# Patient Record
Sex: FEMALE | Race: WHITE | NOT HISPANIC OR LATINO | Employment: FULL TIME | URBAN - NONMETROPOLITAN AREA
[De-identification: names, ages, dates, MRNs, and addresses within clinical notes are randomized per-mention and may not be internally consistent; named-entity substitution may affect disease eponyms.]

---

## 2017-01-04 ENCOUNTER — TRANSCRIBE ORDERS (OUTPATIENT)
Dept: PHYSICAL THERAPY | Facility: HOSPITAL | Age: 47
End: 2017-01-04

## 2017-01-04 DIAGNOSIS — S66.812A HAND AND WRIST EXTENSOR TENDON RUPTURE, LEFT, INITIAL ENCOUNTER: Primary | ICD-10-CM

## 2017-01-09 ENCOUNTER — HOSPITAL ENCOUNTER (OUTPATIENT)
Dept: PHYSICAL THERAPY | Facility: HOSPITAL | Age: 47
Setting detail: THERAPIES SERIES
Discharge: HOME | End: 2017-01-09

## 2017-01-09 DIAGNOSIS — S66.812A HAND AND WRIST EXTENSOR TENDON RUPTURE, LEFT, INITIAL ENCOUNTER: ICD-10-CM

## 2017-01-09 DIAGNOSIS — Z48.89 AFTERCARE FOLLOWING SURGERY: Primary | ICD-10-CM

## 2017-01-09 PROCEDURE — 97161 PT EVAL LOW COMPLEX 20 MIN: CPT

## 2017-01-09 NOTE — PROGRESS NOTES
Outpatient Physical Therapy Ortho Initial Evaluation  [facility]     Patient Name: Maribell Whitley  : 1970  MRN: [id number]  Today's Date: 2017      Visit Date: 2017    There is no problem list on file for this patient.       History reviewed. No pertinent past medical history.     Past Surgical History   Procedure Laterality Date   • Hysterectomy     • Gastric bypass  2005   • Cholecystectomy  2016       Visit Dx:     ICD-10-CM ICD-9-CM   1. Aftercare following surgery Z48.89 V58.89   2. Hand and wrist extensor tendon rupture, left, initial encounter S66.812A 842.10             Patient History       17 0850 17 0800       History    Chief Complaint Difficulty with daily activities;Joint stiffness;Joint swelling;Muscle weakness  -TC      Date Current Problem(s) Began 16  -TC      Brief Description of Current Complaint Pt reports that she has had elbow pain with injections over the years, back in May she was exercising a lot and receiving another series of injections. She was getting coffee for her  one morning and felt a pop on the outside of her left elbow. She had some pain and swelling but this disipated and she did not seek attention right away. Months later she finally went to an Orthopedic Surgeon for medical attention/consultation because she had lost function of her left wrist. They found her common ext tendon and sheath had been ruptured on the Left. She underwent sx 16.   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 Yes  -TC      What clinical tests have you had for this problem? MRI  -TC      Results of  Clinical Tests see EPIC imaging section   -TC      Pain     Pain Location Elbow   left   -TC      Pain at Present 0  -TC --  -TC     Pain at Best 0  -TC --  -TC     Pain at Worst 5  -TC --  -TC     Pain Frequency Rarely  -TC --  -TC     Difficulties at work? unable to fully use her arm for typing, lifting, carrying things   -TC --  -TC     Difficulties with ADL's? she has been using simple methods of dressing such as slip on shoes, unable to tie shoes, cook, or clean much  but caan dress herself just requires increased time   -TC --  -TC     Fall Risk Assessment    Any falls in the past year: No  -TC      Services    Prior Rehab/Home Health Experiences Yes  -TC      When was the prior experience with Rehab/Home Health back pain   -TC      Daily Activities    Primary Language English  -TC      Are you able to read Yes  -TC      Are you able to write Yes  -TC      How does patient learn best? Listening;Reading;Demonstration  -TC      Patient is concerned about/has problems with Performing home management (household chores, shopping, care of dependents);Performing job responsibilities/community activities (work, school,;Grasping objects lifting  -TC      Does patient have problems with the following? None  -TC        User Key  (r) = Recorded By, (t) = Taken By, (c) = Cosigned By    Initials Name Provider Type    TC Macey Patel, PT Physical Therapist                PT Ortho       01/09/17 0900    Precautions and Contraindications    Precautions/Limitations other (see comments)  -TC    Precautions no strengthening; No active wrist ROM for 4 weeks, splint for 6-8 weeks   -TC    Pain Assessment    Pain Assessment No/denies pain  -TC    Posture/Observations    Posture/Observations Comments scapular winging noted with erect sitting position   -TC    Sensation    Light Touch No apparent deficits  -TC    Quarter Clearing    Quarter Clearing Upper Quarter Clearing  -TC    Sensory Screen for Light Touch- Upper Quarter  Clearing    C4 (posterior shoulder) Bilateral:;Intact  -TC    C5 (lateral upper arm) Bilateral:;Intact  -TC    C6 (tip of thumb) Bilateral:;Intact  -TC    C7 (tip of 3rd finger) Bilateral:;Intact  -TC    C8 (tip of 5th finger) Bilateral:;Intact  -TC    T1 (medial lower arm) Bilateral:;Intact  -TC    Myotomal Screen- Upper Quarter Clearing    Shoulder flexion (C5) Right:;5 (Normal);Left:  -TC    Elbow flexion/wrist extension (C6) Right:;4+ (Good +)  -TC    Elbow extension/wrist flexion (C7) Right:;4+ (Good +)  -TC    Finger flexion/ (C8) Right:;4 (Good)  -TC    Finger abduction (T1) Right:;4- (Good -)  -TC     --   Right Hand  65,62,65=64lb;Right Pinch  15,14,15=14.6  -TC    ROM (Range of Motion)    General ROM upper extremity range of motion deficits identified;hand range of motion deficits identified  -TC    General ROM Detail L shoulder ROM WFLs;   -TC    General UE Assessment    ROM elbow/forearm, left: UE ROM deficit;wrist, left: UE ROM deficit  -TC    Left Elbow/Forearm    Extension/Flexion PROM Deficit 25 to 65 PROM to AAROM   -TC    Supination PROM Deficit 75  -TC    Pronation PROM Deficit 45  -TC    Left Wrist    Flexion PROM Deficit in neutral wrist and elbow position; 55 deg  -TC    Extension PROM Deficit 10 deg; wrist in neutral position  -TC    General Hand Assessment    ROM left thumb ROM was WNL;left finger ROM deficit  -TC    ROM Detail Left MCP flexion limitation noted 25%; joint mobility WNLs, extensor tendon tightness noted   -TC    MMT (Manual Muscle Testing)    General MMT Assessment upper extremity strength deficits identified;hand strength deficits identified  -TC    Upper Extremity    Upper Ext Manual Muscle Testing Detail L shoulder grossly 4/5; elbow and wrist/  not tested d/t post sx status and precautions  -TC    Girth    Girth Measured? Left Upper Extremity;Right Upper Extremity  -TC    RUE Edema - Circumference (cm)    Proximal to Elbow Crease (5 inches) 21 cm   1 in  prox  -TC    Distal to Elbow Crease (5 inches) 20.5 cm   1 inch distal  -TC    LUE Edema - Circumference (cm)    Proximal to Elbow Crease (5 inches) 22 cm   1 inch proximal  -TC    Distal to Elbow Crease (5 inches) 21.5 cm   1 inch distal   -TC      User Key  (r) = Recorded By, (t) = Taken By, (c) = Cosigned By    Initials Name Provider Type    TC Macey Patel, PT Physical Therapist                            Therapy Education       01/09/17 0850    Therapy Education    Given HEP;Symptoms/condition management;Pain management;Posture/body mechanics  -TC    Program New   added tendon glides of the LUE in the splint  -TC    How Provided Verbal;Demonstration  -TC    Provided to Patient  -TC    Level of Understanding Verbalized;Demonstrated;Teach back education performed  -TC      User Key  (r) = Recorded By, (t) = Taken By, (c) = Cosigned By    Initials Name Provider Type    TC Macey Patel, PT Physical Therapist                PT OP Goals       01/09/17 0850          PT Short Term Goals    STG Date to Achieve 02/06/17  -TC      STG 1 Patient will demonstrate understanding of precautions post surgery.   -TC      STG 1 Progress New  -TC      STG 2 Pt will demonstrate improved swelling of the L elbow region within <0.5cm of the unaffected side.   -TC      STG 2 Progress New  -TC      STG 3 Pt will demonstrate decreased muscle guarding and guarded posturing on the surgical side.   -TC      STG 3 Progress New  -TC      STG 4 Pt will demonstrate full PROM on the affected side of the elbow.  -TC      STG 4 Progress New  -TC      Long Term Goals    LTG Date to Achieve 03/06/17  -TC      LTG 1 Pt will illustrate full PROM of the wrist.   -TC      LTG 1 Progress New  -TC      LTG 2 Pt will illustrate  strength of >20lbs of the LUE with elbow by side.   -TC      LTG 2 Progress New  -TC      LTG 3 Pt will illustrate pinch  of LUE of >5lbs in order to begin to improve her functional ability with ADLs and work  related actvity. .  -TC      LTG 3 Progress New  -TC      LTG 4 Pt will report ability to perform typing activity without increased pain and signs of fatigue.   -TC      LTG 4 Progress New  -TC      Time Calculation    PT Goal Re-Cert Due Date 02/09/17  -TC        User Key  (r) = Recorded By, (t) = Taken By, (c) = Cosigned By    Initials Name Provider Type    TC Macey Patel, PT Physical Therapist                PT Assessment/Plan       01/09/17 0850          PT Assessment    Functional Limitations Limitation in home management;Limitations in community activities;Limitations in functional capacity and performance;Performance in self-care ADL;Performance in work activities  -TC      Impairments Gait;Pain;Impaired muscle endurance;Muscle strength;Impaired flexibility;Range of motion;Joint mobility;Posture;Impaired muscle power;Poor body mechanics;Impaired muscle length  -TC      Assessment Comments Pt presents to us 18 days status post repair of an extensor tendon rupture on the left. She does not complain of much pain and reports that she rarely will have pain. She denies any changes in sensation or odd paresthesias. She presented with her wrist immobilizer donned and maintained a guarded closed pack position of the left arm initially. Upon assessment she presents with limited elbow joint mobility, carpal mobility, and poor bicep and tricep flexibility. These impairments along with a slight increase in swelling are limiting her PROM in all planes of the elbow and wrist. She was encouraged to relax her arm by her side and try to use her arm with everyday, light activities (no lifting/carrying) while aware of her other precautions s/p surgery. She is highly motivated and young, I believe she will recover quite well and demonstrate great results. Thank you for this referral, we look forward to working with her and will be sure to follow the most evidence based protocol s/p repair and follow healing precautions.    -TC      Please refer to paper survey for additional self-reported information Yes  -TC      Rehab Potential Good  -TC      Patient/caregiver participated in establishment of treatment plan and goals No  -TC      Patient would benefit from skilled therapy intervention Yes  -TC      PT Plan    PT Frequency 3x/week  -TC      Predicted Duration of Therapy Intervention (days/wks) 4-8 weeks   -TC      Planned CPT's? PT EVAL: 84650;PT THER PROC EA 15 MIN: 91090;PT THER ACT EA 15 MIN: 25351;PT MANUAL THERAPY EA 15 MIN: 11376;PT ELECTRICAL STIM UNATTEND: ;PT ELECTRICAL STIM ATTD EA 15 MIN: 07414;PT NEUROMUSC RE-EDUCATION EA 15 MIN: 46080;PT ULTRASOUND EA 15 MIN: 17359  -TC      Physical Therapy Interventions (Optional Details) manual therapy techniques;gross motor skills;home exercise program;joint mobilization;postural re-education;patient/family education;neuromuscular re-education;ROM (Range of Motion);strengthening  -TC      PT Plan Comments Initially we will address edema, joint mobility, muscle extensibility thus improving overall PROM. We will progress her according to proper healing times and the most evidence based research following an extensor tendon repair with AAROM and stability exercises working towards AROM and strengthening as appropriate. Again thank you for this referral!    -TC        User Key  (r) = Recorded By, (t) = Taken By, (c) = Cosigned By    Initials Name Provider Type    TC Macey Patel PT Physical Therapist                                    Time Calculation:   Start Time: 0850  Stop Time: 0922  Time Calculation (min): 32 min     Therapy Charges for Today     Code Description Service Date Service Provider Modifiers Qty    48659491169 HC PT EVAL LOW COMPLEXITY 2 1/9/2017 Macey Patel PT GP 1                    Macey Patel PT, DPT  1/9/2017

## 2017-01-12 ENCOUNTER — HOSPITAL ENCOUNTER (OUTPATIENT)
Dept: PHYSICAL THERAPY | Facility: HOSPITAL | Age: 47
Setting detail: THERAPIES SERIES
Discharge: HOME | End: 2017-01-12

## 2017-01-12 DIAGNOSIS — S66.812A HAND AND WRIST EXTENSOR TENDON RUPTURE, LEFT, INITIAL ENCOUNTER: Primary | ICD-10-CM

## 2017-01-12 DIAGNOSIS — Z48.89 AFTERCARE FOLLOWING SURGERY: ICD-10-CM

## 2017-01-12 PROCEDURE — 97140 MANUAL THERAPY 1/> REGIONS: CPT

## 2017-01-12 PROCEDURE — 97032 APPL MODALITY 1+ESTIM EA 15: CPT

## 2017-01-12 NOTE — PROGRESS NOTES
Outpatient Physical Therapy Ortho Treatment Note  [facility]     Patient Name: Maribell Whitley  : 1970  MRN: [id number]  Today's Date: 2017      Visit Date: 2017    Visit Dx:    ICD-10-CM ICD-9-CM   1. Hand and wrist extensor tendon rupture, left, initial encounter S66.812A 842.10   2. Aftercare following surgery Z48.89 V58.89       There is no problem list on file for this patient.       No past medical history on file.     Past Surgical History   Procedure Laterality Date   • Hysterectomy     • Gastric bypass     • Cholecystectomy                               PT Assessment/Plan       17 1644 17 0800 17 0850    PT Assessment    Functional Limitations   Limitation in home management;Limitations in community activities;Limitations in functional capacity and performance;Performance in self-care ADL;Performance in work activities  -TC    Impairments   Gait;Pain;Impaired muscle endurance;Muscle strength;Impaired flexibility;Range of motion;Joint mobility;Posture;Impaired muscle power;Poor body mechanics;Impaired muscle length  -TC    Assessment Comments  Pt reported slight soreness in UT area but still no guarding. Her joint immobility and soft tissue restrictions continue to limit her overall PROM, this was addressed with manual techniques. We addressed her edema with LaserStim today, reassess next treatment.   -TC Pt presents to us 18 days status post repair of an extensor tendon rupture on the left. She does not complain of much pain and reports that she rarely will have pain. She denies any changes in sensation or odd paresthesias. She presented with her wrist immobilizer donned and maintained a guarded closed pack position of the left arm initially. Upon assessment she presents with limited elbow joint mobility, carpal mobility, and poor bicep and tricep flexibility. These impairments along with a slight increase in swelling are limiting her PROM in all planes of the  elbow and wrist. She was encouraged to relax her arm by her side and try to use her arm with everyday, light activities (no lifting/carrying) while aware of her other precautions s/p surgery. She is highly motivated and young, I believe she will recover quite well and demonstrate great results. Thank you for this referral, we look forward to working with her and will be sure to follow the most evidence based protocol s/p repair and follow healing precautions.   -TC    Please refer to paper survey for additional self-reported information   Yes  -TC    Rehab Potential   Good  -TC    Patient/caregiver participated in establishment of treatment plan and goals   No  -TC    Patient would benefit from skilled therapy intervention   Yes  -TC    PT Plan    PT Frequency   3x/week  -TC    Predicted Duration of Therapy Intervention (days/wks)   4-8 weeks   -TC    Planned CPT's?   PT EVAL: 79802;PT THER PROC EA 15 MIN: 07229;PT THER ACT EA 15 MIN: 41073;PT MANUAL THERAPY EA 15 MIN: 17597;PT ELECTRICAL STIM UNATTEND: ;PT ELECTRICAL STIM ATTD EA 15 MIN: 54923;PT NEUROMUSC RE-EDUCATION EA 15 MIN: 12806;PT ULTRASOUND EA 15 MIN: 79483  -TC    Physical Therapy Interventions (Optional Details)   manual therapy techniques;gross motor skills;home exercise program;joint mobilization;postural re-education;patient/family education;neuromuscular re-education;ROM (Range of Motion);strengthening  -TC    PT Plan Comments  Continue to focus on decreasing edema, mobility, guarding to improve PROM.   -TC Initially we will address edema, joint mobility, muscle extensibility thus improving overall PROM. We will progress her according to proper healing times and the most evidence based research following an extensor tendon repair with AAROM and stability exercises working towards AROM and strengthening as appropriate. Again thank you for this referral!    -TC      User Key  (r) = Recorded By, (t) = Taken By, (c) = Cosigned By    Initials Name  Provider Type    TC Macey Patel, PT Physical Therapist                    Exercises       01/13/17 1546          Subjective Comments    Subjective Comments Pt reports little less sore today, she has been able to move more at work and has been more aware of her posture.  -TC      Subjective Pain    Able to rate subjective pain? yes  -TC      Pre-Treatment Pain Level 1  -TC      Subjective Pain Comment L UT soreness   -TC      Exercise 1    Exercise Name 1 AROM elbow sup/pron seated elbow and wrist hand supported in neutral    without splint   -TC      Cueing 1 Verbal;Tactile;Demo  -TC      Time (Minutes) 1 5  -TC      Exercise 2    Exercise Name 2 seated, forearm on table in pronated position, performed active digit extension to MCP, wrist in neutral   -TC      Cueing 2 Verbal;Tactile;Demo  -TC      Sets 2 3  -TC      Reps 2 5  -TC      Exercise 3    Exercise Name 3 seated therpist supporting L wrist and MCP in ext, pt performed available digit ext   -TC      Cueing 3 Verbal;Tactile;Demo  -TC      Sets 3 3  -TC      Reps 3 5  -TC      Exercise 4    Exercise Name 4 Reviewed tendon glides with wrist in neutral, brace doffed   -TC      Time (Minutes) 4 1  -TC        User Key  (r) = Recorded By, (t) = Taken By, (c) = Cosigned By    Initials Name Provider Type    TC Macey Patel, PT Physical Therapist                        Manual Rx (last 36 hours)      Manual Treatments       01/13/17 1546 01/12/17 0800       Manual Rx 1    Manual Rx 1 Location L biceps, triceps, brachialis,   -TC L biceps, triceps, brachialis,   -TC     Manual Rx 1 Type mod STM, TrPs noted but no deep pressure applied today just mod   -TC mod STM, TrPs noted but no deep pressure applied today just mod   -TC     Manual Rx 1 Grade improved elbow PROM post   -TC improved elbow PROM post   -TC     Manual Rx 1 Duration 10  -TC 10  -TC     Manual Rx 2    Manual Rx 2 Location L ECRB, ECRL, brachioradialis  -TC L ECRB, ECRL, brachioradialis  -TC      Manual Rx 2 Type light STM  -TC light STM  -TC     Manual Rx 2 Duration 5  -TC 10  -TC     Manual Rx 3    Manual Rx 3 Location Scar  on L   -TC Scar  on L   -TC     Manual Rx 3 Type Scar tissue mob--light  -TC Scar tissue mob--light  -TC     Manual Rx 3 Duration 5  -TC 5  -TC     Manual Rx 4    Manual Rx 4 Location biceps stretch   -TC biceps stretch   -TC     Manual Rx 4 Duration 2  -TC 2  -TC     Manual Rx 5    Manual Rx 5 Location tricep stretch   -TC tricep stretch   -TC     Manual Rx 5 Duration 2  -TC 2  -TC     Manual Rx 6    Manual Rx 6 Location L wrist and hand  -TC L wrist and hand  -TC     Manual Rx 6 Type light PROM 80 flex 40 ext, no OP or stretching, available to tolerated range   -TC light PROM 80 flex 40 ext, no OP or stretching, available to tolerated range   -TC     Manual Rx 6 Duration 10  -TC 10  -TC       User Key  (r) = Recorded By, (t) = Taken By, (c) = Cosigned By    Initials Name Provider Type    TC Macey Patel, PT Physical Therapist                PT OP Goals       01/13/17 1546 01/12/17 0800 01/09/17 0850    PT Short Term Goals    STG Date to Achieve 02/06/17  -TC 02/06/17  -TC 02/06/17  -TC    STG 1 Patient will demonstrate understanding of precautions post surgery.   -TC Patient will demonstrate understanding of precautions post surgery.   -TC Patient will demonstrate understanding of precautions post surgery.   -TC    STG 1 Progress Ongoing  -TC Ongoing  -TC New  -TC    STG 1 Progress Comments  reviewed and understood today  -TC     STG 2 Pt will demonstrate improved swelling of the L elbow region within <0.5cm of the unaffected side.   -TC Pt will demonstrate improved swelling of the L elbow region within <0.5cm of the unaffected side.   -TC Pt will demonstrate improved swelling of the L elbow region within <0.5cm of the unaffected side.   -TC    STG 2 Progress Ongoing  -TC Ongoing  -TC New  -TC    STG 2 Progress Comments  22 prox, 21.5 distal to elbow; same today as  evaluation   -TC     STG 3 Pt will demonstrate decreased muscle guarding and guarded posturing on the surgical side.   -TC Pt will demonstrate decreased muscle guarding and guarded posturing on the surgical side.   -TC Pt will demonstrate decreased muscle guarding and guarded posturing on the surgical side.   -TC    STG 3 Progress Ongoing  -TC Ongoing  -TC New  -TC    STG 3 Progress Comments improving but still guarded and tight limiting ROM   -TC UT, elbow flexors, extensors still tight/guarded; ST restrictions present; wrist extensors slightly tender  -TC     STG 4 Pt will demonstrate full PROM on the affected side of the elbow.  -TC Pt will demonstrate full PROM on the affected side of the elbow.  -TC Pt will demonstrate full PROM on the affected side of the elbow.  -TC    STG 4 Progress Ongoing  -TC Ongoing  -TC New  -TC    STG 4 Progress Comments  worked on today  -TC     Long Term Goals    LTG Date to Achieve 03/06/17  -TC 03/06/17  -TC 03/06/17  -TC    LTG 1 Pt will illustrate full PROM of the wrist.   -TC Pt will illustrate full PROM of the wrist.   -TC Pt will illustrate full PROM of the wrist.   -TC    LTG 1 Progress Ongoing  -TC Ongoing  -TC New  -TC    LTG 1 Progress Comments still limited cristo with extension   -TC working towards  -TC     LTG 2 Pt will illustrate  strength of >20lbs of the LUE with elbow by side.   -TC Pt will illustrate  strength of >20lbs of the LUE with elbow by side.   -TC Pt will illustrate  strength of >20lbs of the LUE with elbow by side.   -TC    LTG 2 Progress Ongoing  -TC Ongoing  -TC New  -TC    LTG 3 Pt will illustrate pinch  of LUE of >5lbs in order to begin to improve her functional ability with ADLs and work related actvity. .  -TC Pt will illustrate pinch  of LUE of >5lbs in order to begin to improve her functional ability with ADLs and work related actvity. .  -TC Pt will illustrate pinch  of LUE of >5lbs in order to begin to improve her  functional ability with ADLs and work related actvity. .  -TC    LTG 3 Progress Ongoing  -TC Ongoing  -TC New  -TC    LTG 4 Pt will report ability to perform typing activity without increased pain and signs of fatigue.   -TC Pt will report ability to perform typing activity without increased pain and signs of fatigue.   -TC Pt will report ability to perform typing activity without increased pain and signs of fatigue.   -TC    LTG 4 Progress Ongoing  -TC Ongoing  -TC New  -TC    Time Calculation    PT Goal Re-Cert Due Date 02/09/17  -TC 02/09/17  -TC 02/09/17  -TC      User Key  (r) = Recorded By, (t) = Taken By, (c) = Cosigned By    Initials Name Provider Type    TC Macye Patel, PT Physical Therapist                Therapy Education       01/13/17 1642    Therapy Education    Given HEP;Posture/body mechanics  -TC    Program New   added digit ext with wrist in neutral, and elbow pron/sup with forearm supoorted and wrist in neutral   -TC    How Provided Verbal;Demonstration  -TC    Provided to Patient  -TC    Level of Understanding Demonstrated;Verbalized  -TC      01/12/17 0907    Therapy Education    Given HEP;Posture/body mechanics  -TC    Program Reinforced  -TC    How Provided Verbal  -TC    Provided to Patient  -TC    Level of Understanding Verbalized;Teach back education performed  -TC      01/09/17 0850    Therapy Education    Given HEP;Symptoms/condition management;Pain management;Posture/body mechanics  -TC    Program New   added tendon glides of the LUE in the splint  -TC    How Provided Verbal;Demonstration  -TC    Provided to Patient  -TC    Level of Understanding Verbalized;Demonstrated;Teach back education performed  -TC      User Key  (r) = Recorded By, (t) = Taken By, (c) = Cosigned By    Initials Name Provider Type    TC Macey Patel, PT Physical Therapist                Time Calculation:   Start Time: 0800  Stop Time: 0854  Time Calculation (min): 54 min  PT Non-Billable Time (min): 18  min  Total Timed Code Minutes- PT: 36 minute(s)    Therapy Charges for Today     Code Description Service Date Service Provider Modifiers Qty    82836430768 HC PT ELEC STIM EA-PER 15 MIN 1/12/2017 Macey Patel, PT GP 1    35101721210 HC PT MANUAL THERAPY EA 15 MIN 1/12/2017 Macey Patel, PT GP 2    28567524607 HC PT THER SUPP EA 15 MIN 1/12/2017 Macey Patel, PT GP 1                    Macey Patel, PT  1/13/2017

## 2017-01-13 ENCOUNTER — HOSPITAL ENCOUNTER (OUTPATIENT)
Dept: PHYSICAL THERAPY | Facility: HOSPITAL | Age: 47
Setting detail: THERAPIES SERIES
Discharge: HOME | End: 2017-01-13

## 2017-01-13 DIAGNOSIS — S66.812A HAND AND WRIST EXTENSOR TENDON RUPTURE, LEFT, INITIAL ENCOUNTER: Primary | ICD-10-CM

## 2017-01-13 DIAGNOSIS — Z48.89 AFTERCARE FOLLOWING SURGERY: ICD-10-CM

## 2017-01-13 PROCEDURE — 97140 MANUAL THERAPY 1/> REGIONS: CPT

## 2017-01-13 PROCEDURE — 97110 THERAPEUTIC EXERCISES: CPT

## 2017-01-13 NOTE — PROGRESS NOTES
Outpatient Physical Therapy Ortho Treatment Note   [place]     Patient Name: Maribell Whitley  : 1970  MRN: [id number]  Today's Date: 2017      Visit Date: 2017    Visit Dx:    ICD-10-CM ICD-9-CM   1. Hand and wrist extensor tendon rupture, left, initial encounter S66.812A 842.10   2. Aftercare following surgery Z48.89 V58.89       There is no problem list on file for this patient.       No past medical history on file.     Past Surgical History   Procedure Laterality Date   • Hysterectomy     • Gastric bypass     • Cholecystectomy                               PT Assessment/Plan       17 1644 17 0800 17 0850    PT Assessment    Functional Limitations   Limitation in home management;Limitations in community activities;Limitations in functional capacity and performance;Performance in self-care ADL;Performance in work activities  -TC    Impairments   Gait;Pain;Impaired muscle endurance;Muscle strength;Impaired flexibility;Range of motion;Joint mobility;Posture;Impaired muscle power;Poor body mechanics;Impaired muscle length  -TC    Assessment Comments Patient stated that the soreness in her UT is improved, she has been working on her posture and on staying active at work. I was able to achieve more PROM of the elbow and wrist today but her joint immobility continues to limit her overall motion. Her guarding is improving overall and we began distal initiation exercises in wrist neutral today in order to begin to promote some muscle stimulation and we also initiated ROM activities making sure to follow precautions an avoid active wrist ext.   -TC Pt reported slight soreness in UT area but still no guarding   -TC Pt presents to us 18 days status post repair of an extensor tendon rupture on the left. She does not complain of much pain and reports that she rarely will have pain. She denies any changes in sensation or odd paresthesias. She presented with her wrist immobilizer  donned and maintained a guarded closed pack position of the left arm initially. Upon assessment she presents with limited elbow joint mobility, carpal mobility, and poor bicep and tricep flexibility. These impairments along with a slight increase in swelling are limiting her PROM in all planes of the elbow and wrist. She was encouraged to relax her arm by her side and try to use her arm with everyday, light activities (no lifting/carrying) while aware of her other precautions s/p surgery. She is highly motivated and young, I believe she will recover quite well and demonstrate great results. Thank you for this referral, we look forward to working with her and will be sure to follow the most evidence based protocol s/p repair and follow healing precautions.   -TC    Please refer to paper survey for additional self-reported information   Yes  -TC    Rehab Potential   Good  -TC    Patient/caregiver participated in establishment of treatment plan and goals   No  -TC    Patient would benefit from skilled therapy intervention   Yes  -TC    PT Plan    PT Frequency   3x/week  -TC    Predicted Duration of Therapy Intervention (days/wks)   4-8 weeks   -TC    Planned CPT's?   PT EVAL: 96902;PT THER PROC EA 15 MIN: 11405;PT THER ACT EA 15 MIN: 59396;PT MANUAL THERAPY EA 15 MIN: 41059;PT ELECTRICAL STIM UNATTEND: ;PT ELECTRICAL STIM ATTD EA 15 MIN: 44965;PT NEUROMUSC RE-EDUCATION EA 15 MIN: 99540;PT ULTRASOUND EA 15 MIN: 00570  -TC    Physical Therapy Interventions (Optional Details)   manual therapy techniques;gross motor skills;home exercise program;joint mobilization;postural re-education;patient/family education;neuromuscular re-education;ROM (Range of Motion);strengthening  -TC    PT Plan Comments Continue to address edema, joint mobility, guarding and improve PROM. Reassess benefit of new HEP next session.   -TC  Initially we will address edema, joint mobility, muscle extensibility thus improving overall PROM. We will  progress her according to proper healing times and the most evidence based research following an extensor tendon repair with AAROM and stability exercises working towards AROM and strengthening as appropriate. Again thank you for this referral!    -TC      User Key  (r) = Recorded By, (t) = Taken By, (c) = Cosigned By    Initials Name Provider Type    TC Macey Patel, PT Physical Therapist                    Exercises       01/13/17 1546          Subjective Comments    Subjective Comments Pt reports little less sore today, she has been able to move more at work and has been more aware of her posture.  -TC      Subjective Pain    Able to rate subjective pain? yes  -TC      Pre-Treatment Pain Level 1  -TC      Subjective Pain Comment L UT soreness   -TC      Exercise 1    Exercise Name 1 AROM elbow sup/pron seated elbow and wrist hand supported in neutral    without splint   -TC      Cueing 1 Verbal;Tactile;Demo  -TC      Time (Minutes) 1 5  -TC      Exercise 2    Exercise Name 2 seated, forearm on table in pronated position, performed active digit extension to MCP, wrist in neutral   -TC      Cueing 2 Verbal;Tactile;Demo  -TC      Sets 2 3  -TC      Reps 2 5  -TC      Exercise 3    Exercise Name 3 seated therpist supporting L wrist and MCP in ext, pt performed available digit ext   -TC      Cueing 3 Verbal;Tactile;Demo  -TC      Sets 3 3  -TC      Reps 3 5  -TC      Exercise 4    Exercise Name 4 Reviewed tendon glides with wrist in neutral, brace doffed   -TC      Time (Minutes) 4 1  -TC        User Key  (r) = Recorded By, (t) = Taken By, (c) = Cosigned By    Initials Name Provider Type    TC Macey Patel, PT Physical Therapist                        Manual Rx (last 36 hours)      Manual Treatments       01/13/17 1546 01/12/17 0800       Manual Rx 1    Manual Rx 1 Location L biceps, triceps, brachialis,   -TC L biceps, triceps, brachialis,   -TC     Manual Rx 1 Type mod STM, TrPs noted but no deep  pressure applied today just mod   -TC mod STM, TrPs noted but no deep pressure applied today just mod   -TC     Manual Rx 1 Grade improved elbow PROM post   -TC improved elbow PROM post   -TC     Manual Rx 1 Duration 10  -TC 10  -TC     Manual Rx 2    Manual Rx 2 Location L ECRB, ECRL, brachioradialis  -TC L ECRB, ECRL, brachioradialis  -TC     Manual Rx 2 Type light STM  -TC light STM  -TC     Manual Rx 2 Duration 5  -TC 10  -TC     Manual Rx 3    Manual Rx 3 Location Scar  on L   -TC Scar  on L   -TC     Manual Rx 3 Type Scar tissue mob--light  -TC Scar tissue mob--light  -TC     Manual Rx 3 Duration 5  -TC 5  -TC     Manual Rx 4    Manual Rx 4 Location biceps stretch   -TC biceps stretch   -TC     Manual Rx 4 Duration 2  -TC 2  -TC     Manual Rx 5    Manual Rx 5 Location tricep stretch   -TC tricep stretch   -TC     Manual Rx 5 Duration 2  -TC 2  -TC     Manual Rx 6    Manual Rx 6 Location L wrist and hand  -TC L wrist and hand  -TC     Manual Rx 6 Type light PROM 80 flex 40 ext, no OP or stretching, available to tolerated range   -TC light PROM 80 flex 40 ext, no OP or stretching, available to tolerated range   -TC     Manual Rx 6 Duration 10  -TC 10  -TC       User Key  (r) = Recorded By, (t) = Taken By, (c) = Cosigned By    Initials Name Provider Type    TC Macey Patel, PT Physical Therapist                PT OP Goals       01/13/17 1546 01/12/17 0800 01/09/17 0850    PT Short Term Goals    STG Date to Achieve 02/06/17  -TC 02/06/17  -TC 02/06/17  -TC    STG 1 Patient will demonstrate understanding of precautions post surgery.   -TC Patient will demonstrate understanding of precautions post surgery.   -TC Patient will demonstrate understanding of precautions post surgery.   -TC    STG 1 Progress Ongoing  -TC Ongoing  -TC New  -TC    STG 1 Progress Comments  reviewed and understood today  -TC     STG 2 Pt will demonstrate improved swelling of the L elbow region within <0.5cm of the unaffected side.   -TC  Pt will demonstrate improved swelling of the L elbow region within <0.5cm of the unaffected side.   -TC Pt will demonstrate improved swelling of the L elbow region within <0.5cm of the unaffected side.   -TC    STG 2 Progress Ongoing  -TC Ongoing  -TC New  -TC    STG 2 Progress Comments  22 prox, 21.5 distal to elbow; same today as evaluation   -TC     STG 3 Pt will demonstrate decreased muscle guarding and guarded posturing on the surgical side.   -TC Pt will demonstrate decreased muscle guarding and guarded posturing on the surgical side.   -TC Pt will demonstrate decreased muscle guarding and guarded posturing on the surgical side.   -TC    STG 3 Progress Ongoing  -TC Ongoing  -TC New  -TC    STG 3 Progress Comments improving but still guarded and tight limiting ROM   -TC UT, elbow flexors, extensors still tight/guarded; ST restrictions present; wrist extensors slightly tender  -TC     STG 4 Pt will demonstrate full PROM on the affected side of the elbow.  -TC Pt will demonstrate full PROM on the affected side of the elbow.  -TC Pt will demonstrate full PROM on the affected side of the elbow.  -TC    STG 4 Progress Ongoing  -TC Ongoing  -TC New  -TC    STG 4 Progress Comments  worked on today  -TC     Long Term Goals    LTG Date to Achieve 03/06/17  -TC 03/06/17  -TC 03/06/17  -TC    LTG 1 Pt will illustrate full PROM of the wrist.   -TC Pt will illustrate full PROM of the wrist.   -TC Pt will illustrate full PROM of the wrist.   -TC    LTG 1 Progress Ongoing  -TC Ongoing  -TC New  -TC    LTG 1 Progress Comments still limited cristo with extension   -TC working towards  -TC     LTG 2 Pt will illustrate  strength of >20lbs of the LUE with elbow by side.   -TC Pt will illustrate  strength of >20lbs of the LUE with elbow by side.   -TC Pt will illustrate  strength of >20lbs of the LUE with elbow by side.   -TC    LTG 2 Progress Ongoing  -TC Ongoing  -TC New  -TC    LTG 3 Pt will illustrate pinch  of LUE  of >5lbs in order to begin to improve her functional ability with ADLs and work related actvity. .  -TC Pt will illustrate pinch  of LUE of >5lbs in order to begin to improve her functional ability with ADLs and work related actvity. .  -TC Pt will illustrate pinch  of LUE of >5lbs in order to begin to improve her functional ability with ADLs and work related actvity. .  -TC    LTG 3 Progress Ongoing  -TC Ongoing  -TC New  -TC    LTG 4 Pt will report ability to perform typing activity without increased pain and signs of fatigue.   -TC Pt will report ability to perform typing activity without increased pain and signs of fatigue.   -TC Pt will report ability to perform typing activity without increased pain and signs of fatigue.   -TC    LTG 4 Progress Ongoing  -TC Ongoing  -TC New  -TC    Time Calculation    PT Goal Re-Cert Due Date 02/09/17  -TC 02/09/17  -TC 02/09/17  -TC      User Key  (r) = Recorded By, (t) = Taken By, (c) = Cosigned By    Initials Name Provider Type    TC Macey Patel, PT Physical Therapist                Therapy Education       01/13/17 1642    Therapy Education    Given HEP;Posture/body mechanics  -TC    Program New   added digit ext with wrist in neutral, and elbow pron/sup with forearm supoorted and wrist in neutral   -TC    How Provided Verbal;Demonstration  -TC    Provided to Patient  -TC    Level of Understanding Demonstrated;Verbalized  -TC      01/12/17 0907    Therapy Education    Given HEP;Posture/body mechanics  -TC    Program Reinforced  -TC    How Provided Verbal  -TC    Provided to Patient  -TC    Level of Understanding Verbalized;Teach back education performed  -TC      01/09/17 0850    Therapy Education    Given HEP;Symptoms/condition management;Pain management;Posture/body mechanics  -TC    Program New   added tendon glides of the LUE in the splint  -TC    How Provided Verbal;Demonstration  -TC    Provided to Patient  -TC    Level of Understanding  Verbalized;Demonstrated;Teach back education performed  -TC      User Key  (r) = Recorded By, (t) = Taken By, (c) = Cosigned By    Initials Name Provider Type    TC Macey Patel, PT Physical Therapist                Time Calculation:   Start Time: 1546  Stop Time: 1632  Time Calculation (min): 46 min  Total Timed Code Minutes- PT: 46 minute(s)    Therapy Charges for Today     Code Description Service Date Service Provider Modifiers Qty    30281494225 HC PT MANUAL THERAPY EA 15 MIN 1/13/2017 Macey Patel, PT GP 2    86419984259 HC PT THER PROC EA 15 MIN 1/13/2017 Macey Patel PT GP 1                    Macey Patel PT  1/13/2017

## 2017-01-16 ENCOUNTER — HOSPITAL ENCOUNTER (OUTPATIENT)
Dept: PHYSICAL THERAPY | Facility: HOSPITAL | Age: 47
Setting detail: THERAPIES SERIES
Discharge: HOME | End: 2017-01-16

## 2017-01-16 DIAGNOSIS — Z48.89 AFTERCARE FOLLOWING SURGERY: Primary | ICD-10-CM

## 2017-01-16 DIAGNOSIS — S66.812A HAND AND WRIST EXTENSOR TENDON RUPTURE, LEFT, INITIAL ENCOUNTER: ICD-10-CM

## 2017-01-16 PROCEDURE — 97032 APPL MODALITY 1+ESTIM EA 15: CPT

## 2017-01-16 PROCEDURE — 97140 MANUAL THERAPY 1/> REGIONS: CPT

## 2017-01-16 NOTE — PROGRESS NOTES
Outpatient Physical Therapy Ortho Treatment Note  [facility]     Patient Name: Maribell Whitley  : 1970  MRN: [id number]  Today's Date: 2017      Visit Date: 2017    Visit Dx:    ICD-10-CM ICD-9-CM   1. Aftercare following surgery Z48.89 V58.89   2. Hand and wrist extensor tendon rupture, left, initial encounter S66.812A 842.10       There is no problem list on file for this patient.       No past medical history on file.     Past Surgical History   Procedure Laterality Date   • Hysterectomy     • Gastric bypass     • Cholecystectomy  2016                             PT Assessment/Plan       17 1649 17 1644 17 0800    PT Assessment    Assessment Comments Superior lateral portion of her incission was very tender to touch and if this continues to occur we may need to employ some desensitization techniques. However, the scar tissue itself is raised and slightly pink in appearance and we will monitor this as well  -EC Patient stated that the soreness in her UT is improved, she has been working on her posture and on staying active at work. I was able to achieve more PROM of the elbow and wrist today but her joint immobility continues to limit her overall motion. Her guarding is improving overall and we began distal initiation exercises in wrist neutral today in order to begin to promote some muscle stimulation and we also initiated ROM activities making sure to follow precautions an avoid active wrist ext.   -TC Pt reported slight soreness in UT area but still no guarding. Her joint immobility and soft tissue restrictions continue to limit her overall PROM, this was addressed with manual techniques. We addressed her edema with LaserStim today, reassess next treatment.   -TC    PT Plan    PT Plan Comments Continue with joint mobilizations and stretching.  -EC Continue to address edema, joint mobility, guarding and improve PROM. Reassess benefit of new HEP next session.   -TC Continue  to focus on decreasing edema, mobility, guarding to improve PROM.   -TC      User Key  (r) = Recorded By, (t) = Taken By, (c) = Cosigned By    Initials Name Provider Type    ANI Brand PTA Physical Therapy Assistant    TC Macey Patel, PT Physical Therapist                Modalities       01/16/17 1500          Subjective Comments    Subjective Comments Pt denies pain states was very sore over the weekend.  -EC      Subjective Pain    Able to rate subjective pain? yes  -EC      Pre-Treatment Pain Level 0  -EC      Post-Treatment Pain Level 0  -EC      ELECTRICAL STIMULATION    Attended/Unattended Attended  -EC      Stimulation Type --   Laser stim  -EC      Max mAmp --   Chronic Inflammation Mode  -EC      Location/Electrode Placement/Other L lateral elbow/epicondyle  -EC      Rx Minutes 10 mins  -EC        User Key  (r) = Recorded By, (t) = Taken By, (c) = Cosigned By    Initials Name Provider Type    EC Francisco Brand PTA Physical Therapy Assistant                Exercises       01/16/17 1500          Subjective Comments    Subjective Comments Pt denies pain states was very sore over the weekend.  -EC      Subjective Pain    Able to rate subjective pain? yes  -EC      Pre-Treatment Pain Level 0  -EC      Post-Treatment Pain Level 0  -EC        User Key  (r) = Recorded By, (t) = Taken By, (c) = Cosigned By    Initials Name Provider Type    EC Francisco Brand PTA Physical Therapy Assistant                        Manual Rx (last 36 hours)      Manual Treatments       01/16/17 1500          Manual Rx 1    Manual Rx 1 Location L bicep, brachioradialis, and pronator teres  -EC      Manual Rx 1 Type IASTM with EDGE tool (attempted briefly along incision but pt was guarded)  -EC      Manual Rx 1 Duration 15  -EC      Manual Rx 2    Manual Rx 2 Location Radial head  -EC      Manual Rx 2 Type rotational mobilizations  -EC      Manual Rx 2 Grade 2  -EC      Manual Rx 2 Duration 6  -EC      Manual Rx 3     Manual Rx 3 Location radial carpal   -EC      Manual Rx 3 Type rotational mobilizations with intermittent wrist flexion stretches  -EC      Manual Rx 3 Grade 1  -EC      Manual Rx 3 Duration 7  -EC        User Key  (r) = Recorded By, (t) = Taken By, (c) = Cosigned By    Initials Name Provider Type    EC Francisco Brand, DEBRA Physical Therapy Assistant                PT OP Goals       01/13/17 1546 01/12/17 0800 01/09/17 0850    PT Short Term Goals    STG Date to Achieve 02/06/17  -TC 02/06/17  -TC 02/06/17  -TC    STG 1 Patient will demonstrate understanding of precautions post surgery.   -TC Patient will demonstrate understanding of precautions post surgery.   -TC Patient will demonstrate understanding of precautions post surgery.   -TC    STG 1 Progress Ongoing  -TC Ongoing  -TC New  -TC    STG 1 Progress Comments  reviewed and understood today  -TC     STG 2 Pt will demonstrate improved swelling of the L elbow region within <0.5cm of the unaffected side.   -TC Pt will demonstrate improved swelling of the L elbow region within <0.5cm of the unaffected side.   -TC Pt will demonstrate improved swelling of the L elbow region within <0.5cm of the unaffected side.   -TC    STG 2 Progress Ongoing  -TC Ongoing  -TC New  -TC    STG 2 Progress Comments  22 prox, 21.5 distal to elbow; same today as evaluation   -TC     STG 3 Pt will demonstrate decreased muscle guarding and guarded posturing on the surgical side.   -TC Pt will demonstrate decreased muscle guarding and guarded posturing on the surgical side.   -TC Pt will demonstrate decreased muscle guarding and guarded posturing on the surgical side.   -TC    STG 3 Progress Ongoing  -TC Ongoing  -TC New  -TC    STG 3 Progress Comments improving but still guarded and tight limiting ROM   -TC UT, elbow flexors, extensors still tight/guarded; ST restrictions present; wrist extensors slightly tender  -TC     STG 4 Pt will demonstrate full PROM on the affected side of the  elbow.  -TC Pt will demonstrate full PROM on the affected side of the elbow.  -TC Pt will demonstrate full PROM on the affected side of the elbow.  -TC    STG 4 Progress Ongoing  -TC Ongoing  -TC New  -TC    STG 4 Progress Comments  worked on today  -TC     Long Term Goals    LTG Date to Achieve 03/06/17  -TC 03/06/17  -TC 03/06/17  -TC    LTG 1 Pt will illustrate full PROM of the wrist.   -TC Pt will illustrate full PROM of the wrist.   -TC Pt will illustrate full PROM of the wrist.   -TC    LTG 1 Progress Ongoing  -TC Ongoing  -TC New  -TC    LTG 1 Progress Comments still limited cristo with extension   -TC working towards  -TC     LTG 2 Pt will illustrate  strength of >20lbs of the LUE with elbow by side.   -TC Pt will illustrate  strength of >20lbs of the LUE with elbow by side.   -TC Pt will illustrate  strength of >20lbs of the LUE with elbow by side.   -TC    LTG 2 Progress Ongoing  -TC Ongoing  -TC New  -TC    LTG 3 Pt will illustrate pinch  of LUE of >5lbs in order to begin to improve her functional ability with ADLs and work related actvity. .  -TC Pt will illustrate pinch  of LUE of >5lbs in order to begin to improve her functional ability with ADLs and work related actvity. .  -TC Pt will illustrate pinch  of LUE of >5lbs in order to begin to improve her functional ability with ADLs and work related actvity. .  -TC    LTG 3 Progress Ongoing  -TC Ongoing  -TC New  -TC    LTG 4 Pt will report ability to perform typing activity without increased pain and signs of fatigue.   -TC Pt will report ability to perform typing activity without increased pain and signs of fatigue.   -TC Pt will report ability to perform typing activity without increased pain and signs of fatigue.   -TC    LTG 4 Progress Ongoing  -TC Ongoing  -TC New  -TC    Time Calculation    PT Goal Re-Cert Due Date 02/09/17  -TC 02/09/17  -TC 02/09/17  -TC      User Key  (r) = Recorded By, (t) = Taken By, (c) = Cosigned By     Initials Name Provider Type    TC Macey Patel, KARI Physical Therapist                Therapy Education       01/13/17 1642    Therapy Education    Given HEP;Posture/body mechanics  -TC    Program New   added digit ext with wrist in neutral, and elbow pron/sup with forearm supoorted and wrist in neutral   -TC    How Provided Verbal;Demonstration  -TC    Provided to Patient  -TC    Level of Understanding Demonstrated;Verbalized  -TC      01/12/17 0907    Therapy Education    Given HEP;Posture/body mechanics  -TC    Program Reinforced  -TC    How Provided Verbal  -TC    Provided to Patient  -TC    Level of Understanding Verbalized;Teach back education performed  -TC      User Key  (r) = Recorded By, (t) = Taken By, (c) = Cosigned By    Initials Name Provider Type    TC Macey Patel PT Physical Therapist                Time Calculation:   Start Time: 0348  Stop Time: 0426  Time Calculation (min): 38 min  Total Timed Code Minutes- PT: 38 minute(s)    Therapy Charges for Today     Code Description Service Date Service Provider Modifiers Qty    65281229140 HC PT ELEC STIM EA-PER 15 MIN 1/16/2017 Francisco Brand PTA GP 1    32172934780 HC PT MANUAL THERAPY EA 15 MIN 1/16/2017 Francisco Brand PTA GP 2                    Francisco Brand PTA  1/16/2017

## 2017-01-18 ENCOUNTER — HOSPITAL ENCOUNTER (OUTPATIENT)
Dept: PHYSICAL THERAPY | Facility: HOSPITAL | Age: 47
Setting detail: THERAPIES SERIES
Discharge: HOME | End: 2017-01-18

## 2017-01-18 DIAGNOSIS — Z48.89 AFTERCARE FOLLOWING SURGERY: Primary | ICD-10-CM

## 2017-01-18 PROCEDURE — 97140 MANUAL THERAPY 1/> REGIONS: CPT | Performed by: PHYSICAL THERAPIST

## 2017-01-20 ENCOUNTER — HOSPITAL ENCOUNTER (OUTPATIENT)
Dept: PHYSICAL THERAPY | Facility: HOSPITAL | Age: 47
Setting detail: THERAPIES SERIES
Discharge: HOME | End: 2017-01-20

## 2017-01-20 DIAGNOSIS — Z48.89 AFTERCARE FOLLOWING SURGERY: Primary | ICD-10-CM

## 2017-01-20 DIAGNOSIS — S66.812A HAND AND WRIST EXTENSOR TENDON RUPTURE, LEFT, INITIAL ENCOUNTER: ICD-10-CM

## 2017-01-20 PROCEDURE — 97140 MANUAL THERAPY 1/> REGIONS: CPT

## 2017-01-20 NOTE — PROGRESS NOTES
Outpatient Physical Therapy Ortho Treatment Note  [facility]     Patient Name: Maribell Whitley  : 1970  MRN: [id number]  Today's Date: 2017      Visit Date: 2017    Visit Dx:    ICD-10-CM ICD-9-CM   1. Aftercare following surgery Z48.89 V58.89   2. Hand and wrist extensor tendon rupture, left, initial encounter S66.812A 842.10       There is no problem list on file for this patient.       No past medical history on file.     Past Surgical History   Procedure Laterality Date   • Hysterectomy     • Gastric bypass     • Cholecystectomy  2016                             PT Assessment/Plan       17 1546 17 1600 17 1649    PT Assessment    Assessment Comments Her scar tissue mobility is improving quite well. We will measure her swelling next session but not significant difference was noted today. Her muscle extensibility surrounding the elbow continue to be stiff along with joint mobility of the elbow and wrist on the left but she continues to respond well to manual therapy and stretches. After therapy today her wrist extension improved  from 85 to 100 and her elbow flexion to 125, elbow extension measured at 3. We will continue working to restore her ROM.   -TC Needs to work on desensitizing the scar as she is getting decreased scar mobility which can cause long term problems. She was able to feel more stretch into flexion with focus on joint distraction today.  -HR Superior lateral portion of her incission was very tender to touch and if this continues to occur we may need to employ some desensitization techniques. However, the scar tissue itself is raised and slightly pink in appearance and we will monitor this as well  -EC    PT Plan    PT Plan Comments We will measure her inflammation next visit, continue to address elbow and wrist joint mobility and flexibility primarily in order to restore full PROM with progression towards protocol towards AROM.   -TC Continue with current  plan of care following restrictions of surgeon  -HR Continue with joint mobilizations and stretching.  -EC      User Key  (r) = Recorded By, (t) = Taken By, (c) = Cosigned By    Initials Name Provider Type    EC Francisco Brand, PTA Physical Therapy Assistant    HR Elza Paredes, PT Physical Therapist    TC Macey Patel, PT Physical Therapist                    Exercises       01/20/17 1546          Subjective Comments    Subjective Comments Patient reports that she is still a bit sore/achey in her left outside of elbow but not real pain she stated. She thinks she may have overdone her exercises. No increased pain at work   -TC      Subjective Pain    Able to rate subjective pain? yes  -TC      Pre-Treatment Pain Level 0  -TC      Post-Treatment Pain Level 0  -TC        User Key  (r) = Recorded By, (t) = Taken By, (c) = Cosigned By    Initials Name Provider Type    TC Macey Patel, PT Physical Therapist                        Manual Rx (last 36 hours)      Manual Treatments       01/20/17 1546          Manual Rx 1    Manual Rx 1 Location prone thoracic   -TC      Manual Rx 1 Type ext mob to T5-6 with cavitation noted, pt stated relief afterwards, followed by STM to rhomboids, UT and LS   -TC      Manual Rx 1 Grade 3  -TC      Manual Rx 1 Duration 5  -TC      Manual Rx 2    Manual Rx 2 Location Radial head  -TC      Manual Rx 2 Type rotational mobilizations  -TC      Manual Rx 2 Grade 3  -TC      Manual Rx 2 Duration 5  -TC      Manual Rx 3    Manual Rx 3 Location Scar  on L   -TC      Manual Rx 3 Type Scar tissue mob--light  -TC      Manual Rx 3 Grade light pressure but consistent  -TC      Manual Rx 3 Duration 10  -TC      Manual Rx 4    Manual Rx 4 Location biceps stretch with STM applied   -TC      Manual Rx 4 Grade 3 with distraction  -TC      Manual Rx 4 Duration 5  -TC      Manual Rx 5    Manual Rx 5 Location elbow flexion mob  -TC      Manual Rx 5 Grade 3  -TC      Manual Rx 5 Duration 5   -TC      Manual Rx 6    Manual Rx 6 Location L wrist and fingers  -TC      Manual Rx 6 Type PROM through available range; flex/ext,ulnar/radial deviation  -TC      Manual Rx 6 Duration 10  -TC      Manual Rx 7    Manual Rx 7 Location proximal radiocarpal joint  -TC      Manual Rx 7 Type ant glide with intermittent distraction into wrist ext PROM improved from 85 to 110 degrees passively    -TC      Manual Rx 7 Grade 3  -TC      Manual Rx 7 Duration 5  -TC        User Key  (r) = Recorded By, (t) = Taken By, (c) = Cosigned By    Initials Name Provider Type    TC Macey Patel, PT Physical Therapist                PT OP Goals       01/20/17 1546 01/18/17 1600 01/13/17 1546    PT Short Term Goals    STG Date to Achieve 02/06/17  -TC 02/06/17  -HR 02/06/17  -TC    STG 1 Patient will demonstrate understanding of precautions post surgery.   -TC Patient will demonstrate understanding of precautions post surgery.   -HR Patient will demonstrate understanding of precautions post surgery.   -TC    STG 1 Progress Ongoing  -TC Ongoing  -HR Ongoing  -TC    STG 2 Pt will demonstrate improved swelling of the L elbow region within <0.5cm of the unaffected side.   -TC Pt will demonstrate improved swelling of the L elbow region within <0.5cm of the unaffected side.   -HR Pt will demonstrate improved swelling of the L elbow region within <0.5cm of the unaffected side.   -TC    STG 2 Progress Ongoing  -TC Ongoing  -HR Ongoing  -TC    STG 3 Pt will demonstrate decreased muscle guarding and guarded posturing on the surgical side.   -TC Pt will demonstrate decreased muscle guarding and guarded posturing on the surgical side.   -HR Pt will demonstrate decreased muscle guarding and guarded posturing on the surgical side.   -TC    STG 3 Progress Ongoing  -TC Ongoing  -HR Ongoing  -TC    STG 3 Progress Comments improving, still presents with guarding at times   -TC  improving but still guarded and tight limiting ROM   -TC    STG 4 Pt will  demonstrate full PROM on the affected side of the elbow.  -TC Pt will demonstrate full PROM on the affected side of the elbow.  -HR Pt will demonstrate full PROM on the affected side of the elbow.  -TC    STG 4 Progress Ongoing  -TC Ongoing  -HR Ongoing  -TC    STG 4 Progress Comments working towards joint mobility and mm flexibility in order to improve this  -TC      Long Term Goals    LTG Date to Achieve 03/06/17  -TC 03/06/17  -HR 03/06/17  -TC    LTG 1 Pt will illustrate full PROM of the wrist.   -TC Pt will illustrate full PROM of the wrist.   -HR Pt will illustrate full PROM of the wrist.   -TC    LTG 1 Progress Ongoing  -TC Ongoing  -HR Ongoing  -TC    LTG 1 Progress Comments working towards, still limited with wrist ext mainly; improved from 85 deg passively to 110 post mobs   -TC  still limited cristo with extension   -TC    LTG 2 Pt will illustrate  strength of >20lbs of the LUE with elbow by side.   -TC Pt will illustrate  strength of >20lbs of the LUE with elbow by side.   -HR Pt will illustrate  strength of >20lbs of the LUE with elbow by side.   -TC    LTG 2 Progress Ongoing  -TC Ongoing  -HR Ongoing  -TC    LTG 3 Pt will illustrate pinch  of LUE of >5lbs in order to begin to improve her functional ability with ADLs and work related actvity. .  -TC Pt will illustrate pinch  of LUE of >5lbs in order to begin to improve her functional ability with ADLs and work related actvity. .  -HR Pt will illustrate pinch  of LUE of >5lbs in order to begin to improve her functional ability with ADLs and work related actvity. .  -TC    LTG 3 Progress Ongoing  -TC Ongoing  -HR Ongoing  -TC    LTG 4 Pt will report ability to perform typing activity without increased pain and signs of fatigue.   -TC Pt will report ability to perform typing activity without increased pain and signs of fatigue.   -HR Pt will report ability to perform typing activity without increased pain and signs of fatigue.   -TC     LTG 4 Progress Ongoing  -TC Ongoing  -HR Ongoing  -TC    Time Calculation    PT Goal Re-Cert Due Date 02/09/17  -TC 02/09/17  -HR 02/09/17  -TC      01/12/17 0800 01/09/17 0850       PT Short Term Goals    STG Date to Achieve 02/06/17  -TC 02/06/17  -TC     STG 1 Patient will demonstrate understanding of precautions post surgery.   -TC Patient will demonstrate understanding of precautions post surgery.   -TC     STG 1 Progress Ongoing  -TC New  -TC     STG 1 Progress Comments reviewed and understood today  -TC      STG 2 Pt will demonstrate improved swelling of the L elbow region within <0.5cm of the unaffected side.   -TC Pt will demonstrate improved swelling of the L elbow region within <0.5cm of the unaffected side.   -TC     STG 2 Progress Ongoing  -TC New  -TC     STG 2 Progress Comments 22 prox, 21.5 distal to elbow; same today as evaluation   -TC      STG 3 Pt will demonstrate decreased muscle guarding and guarded posturing on the surgical side.   -TC Pt will demonstrate decreased muscle guarding and guarded posturing on the surgical side.   -TC     STG 3 Progress Ongoing  -TC New  -TC     STG 3 Progress Comments UT, elbow flexors, extensors still tight/guarded; ST restrictions present; wrist extensors slightly tender  -TC      STG 4 Pt will demonstrate full PROM on the affected side of the elbow.  -TC Pt will demonstrate full PROM on the affected side of the elbow.  -TC     STG 4 Progress Ongoing  -TC New  -TC     STG 4 Progress Comments worked on today  -TC      Long Term Goals    LTG Date to Achieve 03/06/17  -TC 03/06/17  -TC     LTG 1 Pt will illustrate full PROM of the wrist.   -TC Pt will illustrate full PROM of the wrist.   -TC     LTG 1 Progress Ongoing  -TC New  -TC     LTG 1 Progress Comments working towards  -TC      LTG 2 Pt will illustrate  strength of >20lbs of the LUE with elbow by side.   -TC Pt will illustrate  strength of >20lbs of the LUE with elbow by side.   -TC     LTG 2  Progress Ongoing  -TC New  -TC     LTG 3 Pt will illustrate pinch  of LUE of >5lbs in order to begin to improve her functional ability with ADLs and work related actvity. .  -TC Pt will illustrate pinch  of LUE of >5lbs in order to begin to improve her functional ability with ADLs and work related actvity. .  -TC     LTG 3 Progress Ongoing  -TC New  -TC     LTG 4 Pt will report ability to perform typing activity without increased pain and signs of fatigue.   -TC Pt will report ability to perform typing activity without increased pain and signs of fatigue.   -TC     LTG 4 Progress Ongoing  -TC New  -TC     Time Calculation    PT Goal Re-Cert Due Date 02/09/17  -TC 02/09/17  -TC       User Key  (r) = Recorded By, (t) = Taken By, (c) = Cosigned By    Initials Name Provider Type    HR Elza Paredes, PT Physical Therapist    TC Macey Patel, PT Physical Therapist                Therapy Education       01/20/17 1645    Therapy Education    Given HEP;Posture/body mechanics  -TC    Program Reinforced  -TC    How Provided Verbal  -TC    Provided to Patient  -TC    Level of Understanding Verbalized  -TC      User Key  (r) = Recorded By, (t) = Taken By, (c) = Cosigned By    Initials Name Provider Type    TC Macey Patel, PT Physical Therapist                Time Calculation:   Start Time: 1546  Stop Time: 1631  Time Calculation (min): 45 min  Total Timed Code Minutes- PT: 45 minute(s)    Therapy Charges for Today     Code Description Service Date Service Provider Modifiers Qty    50769032924 HC PT MANUAL THERAPY EA 15 MIN 1/20/2017 Macey Patel, PT GP 3                    Macey Patel PT  1/20/2017

## 2017-01-20 NOTE — PROGRESS NOTES
Outpatient Physical Therapy Ortho Treatment Note  [facility]     Patient Name: Maribell Whitley  : 1970  MRN: [id number]  Today's Date: 2017      Visit Date: 2017    Visit Dx:    ICD-10-CM ICD-9-CM   1. Aftercare following surgery Z48.89 V58.89       There is no problem list on file for this patient.       No past medical history on file.     Past Surgical History   Procedure Laterality Date   • Hysterectomy     • Gastric bypass  2005   • Cholecystectomy                               PT Assessment/Plan       17 1600 17 1649 17 1644    PT Assessment    Assessment Comments Needs to work on desensitizing the scar as she is getting decreased scar mobility which can cause long term problems. She was able to feel more stretch into flexion with focus on joint distraction today.  -HR Superior lateral portion of her incission was very tender to touch and if this continues to occur we may need to employ some desensitization techniques. However, the scar tissue itself is raised and slightly pink in appearance and we will monitor this as well  -EC Patient stated that the soreness in her UT is improved, she has been working on her posture and on staying active at work. I was able to achieve more PROM of the elbow and wrist today but her joint immobility continues to limit her overall motion. Her guarding is improving overall and we began distal initiation exercises in wrist neutral today in order to begin to promote some muscle stimulation and we also initiated ROM activities making sure to follow precautions an avoid active wrist ext.   -TC    PT Plan    PT Plan Comments Continue with current plan of care following restrictions of surgeon  -HR Continue with joint mobilizations and stretching.  -EC Continue to address edema, joint mobility, guarding and improve PROM. Reassess benefit of new HEP next session.   -TC      User Key  (r) = Recorded By, (t) = Taken By, (c) = Cosigned By     Initials Name Provider Type    ANI Francisco JEANINE Brand, PTA Physical Therapy Assistant    HR Elza Paredes, PT Physical Therapist    TC Macey Patel, PT Physical Therapist                                       PT OP Goals       01/18/17 1600 01/13/17 1546 01/12/17 0800    PT Short Term Goals    STG Date to Achieve 02/06/17  -HR 02/06/17  -TC 02/06/17  -TC    STG 1 Patient will demonstrate understanding of precautions post surgery.   -HR Patient will demonstrate understanding of precautions post surgery.   -TC Patient will demonstrate understanding of precautions post surgery.   -TC    STG 1 Progress Ongoing  -HR Ongoing  -TC Ongoing  -TC    STG 1 Progress Comments   reviewed and understood today  -TC    STG 2 Pt will demonstrate improved swelling of the L elbow region within <0.5cm of the unaffected side.   -HR Pt will demonstrate improved swelling of the L elbow region within <0.5cm of the unaffected side.   -TC Pt will demonstrate improved swelling of the L elbow region within <0.5cm of the unaffected side.   -TC    STG 2 Progress Ongoing  -HR Ongoing  -TC Ongoing  -TC    STG 2 Progress Comments   22 prox, 21.5 distal to elbow; same today as evaluation   -TC    STG 3 Pt will demonstrate decreased muscle guarding and guarded posturing on the surgical side.   -HR Pt will demonstrate decreased muscle guarding and guarded posturing on the surgical side.   -TC Pt will demonstrate decreased muscle guarding and guarded posturing on the surgical side.   -TC    STG 3 Progress Ongoing  -HR Ongoing  -TC Ongoing  -TC    STG 3 Progress Comments  improving but still guarded and tight limiting ROM   -TC UT, elbow flexors, extensors still tight/guarded; ST restrictions present; wrist extensors slightly tender  -TC    STG 4 Pt will demonstrate full PROM on the affected side of the elbow.  -HR Pt will demonstrate full PROM on the affected side of the elbow.  -TC Pt will demonstrate full PROM on the affected side of the  elbow.  -TC    STG 4 Progress Ongoing  -HR Ongoing  -TC Ongoing  -TC    STG 4 Progress Comments   worked on today  -TC    Long Term Goals    LTG Date to Achieve 03/06/17  -HR 03/06/17  -TC 03/06/17  -TC    LTG 1 Pt will illustrate full PROM of the wrist.   -HR Pt will illustrate full PROM of the wrist.   -TC Pt will illustrate full PROM of the wrist.   -TC    LTG 1 Progress Ongoing  -HR Ongoing  -TC Ongoing  -TC    LTG 1 Progress Comments  still limited cristo with extension   -TC working towards  -TC    LTG 2 Pt will illustrate  strength of >20lbs of the LUE with elbow by side.   -HR Pt will illustrate  strength of >20lbs of the LUE with elbow by side.   -TC Pt will illustrate  strength of >20lbs of the LUE with elbow by side.   -TC    LTG 2 Progress Ongoing  -HR Ongoing  -TC Ongoing  -TC    LTG 3 Pt will illustrate pinch  of LUE of >5lbs in order to begin to improve her functional ability with ADLs and work related actvity. .  -HR Pt will illustrate pinch  of LUE of >5lbs in order to begin to improve her functional ability with ADLs and work related actvity. .  -TC Pt will illustrate pinch  of LUE of >5lbs in order to begin to improve her functional ability with ADLs and work related actvity. .  -TC    LTG 3 Progress Ongoing  -HR Ongoing  -TC Ongoing  -TC    LTG 4 Pt will report ability to perform typing activity without increased pain and signs of fatigue.   -HR Pt will report ability to perform typing activity without increased pain and signs of fatigue.   -TC Pt will report ability to perform typing activity without increased pain and signs of fatigue.   -TC    LTG 4 Progress Ongoing  -HR Ongoing  -TC Ongoing  -TC    Time Calculation    PT Goal Re-Cert Due Date 02/09/17  -HR 02/09/17  -TC 02/09/17  -TC      01/09/17 0850          PT Short Term Goals    STG Date to Achieve 02/06/17  -TC      STG 1 Patient will demonstrate understanding of precautions post surgery.   -TC      STG 1 Progress  New  -TC      STG 2 Pt will demonstrate improved swelling of the L elbow region within <0.5cm of the unaffected side.   -TC      STG 2 Progress New  -TC      STG 3 Pt will demonstrate decreased muscle guarding and guarded posturing on the surgical side.   -TC      STG 3 Progress New  -TC      STG 4 Pt will demonstrate full PROM on the affected side of the elbow.  -TC      STG 4 Progress New  -TC      Long Term Goals    LTG Date to Achieve 03/06/17  -TC      LTG 1 Pt will illustrate full PROM of the wrist.   -TC      LTG 1 Progress New  -TC      LTG 2 Pt will illustrate  strength of >20lbs of the LUE with elbow by side.   -TC      LTG 2 Progress New  -TC      LTG 3 Pt will illustrate pinch  of LUE of >5lbs in order to begin to improve her functional ability with ADLs and work related actvity. .  -TC      LTG 3 Progress New  -TC      LTG 4 Pt will report ability to perform typing activity without increased pain and signs of fatigue.   -TC      LTG 4 Progress New  -TC      Time Calculation    PT Goal Re-Cert Due Date 02/09/17  -TC        User Key  (r) = Recorded By, (t) = Taken By, (c) = Cosigned By    Initials Name Provider Type    HR Elza Paredes, PT Physical Therapist    TC Macey Patel PT Physical Therapist                Therapy Education       01/13/17 1642    Therapy Education    Given HEP;Posture/body mechanics  -TC    Program New   added digit ext with wrist in neutral, and elbow pron/sup with forearm supoorted and wrist in neutral   -TC    How Provided Verbal;Demonstration  -TC    Provided to Patient  -TC    Level of Understanding Demonstrated;Verbalized  -TC      User Key  (r) = Recorded By, (t) = Taken By, (c) = Cosigned By    Initials Name Provider Type    TC Macey Patel, PT Physical Therapist                Time Calculation:   Start Time: 1545  Stop Time: 1625  Time Calculation (min): 40 min  Total Timed Code Minutes- PT: 40 minute(s)                  Elza Paredes,  PT  1/20/2017

## 2017-01-23 ENCOUNTER — HOSPITAL ENCOUNTER (OUTPATIENT)
Dept: PHYSICAL THERAPY | Facility: HOSPITAL | Age: 47
Setting detail: THERAPIES SERIES
Discharge: HOME | End: 2017-01-23

## 2017-01-23 DIAGNOSIS — S66.812A HAND AND WRIST EXTENSOR TENDON RUPTURE, LEFT, INITIAL ENCOUNTER: ICD-10-CM

## 2017-01-23 DIAGNOSIS — Z48.89 AFTERCARE FOLLOWING SURGERY: Primary | ICD-10-CM

## 2017-01-23 PROCEDURE — 97140 MANUAL THERAPY 1/> REGIONS: CPT

## 2017-01-23 PROCEDURE — 97110 THERAPEUTIC EXERCISES: CPT

## 2017-01-23 NOTE — PROGRESS NOTES
Outpatient Physical Therapy Ortho Treatment Note  [facility]     Patient Name: Maribell Whitley  : 1970  MRN: [id number]  Today's Date: 2017      Visit Date: 2017    Visit Dx:    ICD-10-CM ICD-9-CM   1. Aftercare following surgery Z48.89 V58.89   2. Hand and wrist extensor tendon rupture, left, initial encounter S66.812A 842.10       There is no problem list on file for this patient.       No past medical history on file.     Past Surgical History   Procedure Laterality Date   • Hysterectomy     • Gastric bypass  2005   • Cholecystectomy  2016                             PT Assessment/Plan       17 1652 17 1546 17 1600    PT Assessment    Assessment Comments She bumped her L elbow on her dresser in the middle of the night the other night. Her elbow has been tender to touch since then and she has not been doing her scar tissue mobilization. Her L elbow was slightly more swollen today by .5cm, her scar mobility was less compared to last week and her wrist mobility was much more contrained vs last session. She admitted she had been guarding it a bit more. I educated her to hold off one more night on scar mobility but to continue all other exercises. We will continue to address her inflammation, mobility, guarding and restore ROM working towards AAR.   -TC Her scar tissue mobility is improving quite well. We will measure her swelling next session but not significant difference was noted today. Her muscle extensibility surrounding the elbow continue to be stiff along with joint mobility of the elbow and wrist on the left but she continues to respond well to manual therapy and stretches. After therapy today her wrist extension improved  from 85 to 100 and her elbow flexion to 125, elbow extension measured at 3. We will continue working to restore her ROM.   -TC Needs to work on desensitizing the scar as she is getting decreased scar mobility which can cause long term problems. She was  able to feel more stretch into flexion with focus on joint distraction today.  -HR    PT Plan    PT Plan Comments Continue techniques to improve joint mobility and flexibillity, decrease inflammation and guarding in order to restore PROM. Progressing slowly towards AAROM as tolerated  this week.   -TC We will measure her inflammation next visit, continue to address elbow and wrist joint mobility and flexibility primarily in order to restore full PROM with progression towards protocol towards AROM.   -TC Continue with current plan of care following restrictions of surgeon  -HR      User Key  (r) = Recorded By, (t) = Taken By, (c) = Cosigned By    Initials Name Provider Type    HR Elza Paredes, PT Physical Therapist    TC Macey Patel, PT Physical Therapist                    Exercises       01/23/17 5192          Subjective Comments    Subjective Comments Pt reported that she has been sore since she hit her L elbow on her dresser during the night the other night. .   -TC      Subjective Pain    Able to rate subjective pain? yes  -TC      Pre-Treatment Pain Level 0  -TC      Post-Treatment Pain Level 0  -TC      Subjective Pain Comment L achey , and tender to touch   -TC      Exercise 1    Exercise Name 1 Brace doffed   -TC      Exercise 2    Exercise Name 2 seated therapist supporting L wrist and MCP in extension; performed digit extension simultaneously   -TC      Sets 2 3  -TC      Reps 2 5  -TC      Exercise 3    Exercise Name 3 seated therpist supporting L wrist and MCP in ext, pt performed available digit ext alternating   -TC      Cueing 3 Verbal;Tactile;Demo  -TC      Sets 3 3  -TC      Reps 3 5  -TC      Exercise 4    Exercise Name 4 seated, therapist supporting L wrist and MCP in ext performed wrist ext light (sub max) isometrics   -TC      Cueing 4 Verbal;Tactile;Demo  -TC      Sets 4 3  -TC      Reps 4 5  -TC        User Key  (r) = Recorded By, (t) = Taken By, (c) = Cosigned By    Initials  Name Provider Type    TC Macey Patel, PT Physical Therapist                        Manual Rx (last 36 hours)      Manual Treatments       01/23/17 1546          Manual Rx 2    Manual Rx 2 Location Radial head  -TC      Manual Rx 2 Type rotational mobilizations  -TC      Manual Rx 2 Grade 3  -TC      Manual Rx 2 Duration 5  -TC      Manual Rx 3    Manual Rx 3 Location Scar  on L   -TC      Manual Rx 3 Type Scar tissue mob--light  -TC      Manual Rx 3 Grade light pressure but consistent  -TC      Manual Rx 3 Duration 10  -TC      Manual Rx 4    Manual Rx 4 Location biceps stretch with STM applied   -TC      Manual Rx 4 Grade 3 with distraction  -TC      Manual Rx 4 Duration 5  -TC      Manual Rx 5    Manual Rx 5 Location elbow flexion mob  -TC      Manual Rx 5 Grade 3  -TC      Manual Rx 5 Duration 5  -TC      Manual Rx 6    Manual Rx 6 Location L wrist and fingers  -TC      Manual Rx 6 Type PROM through available range; flex/ext,ulnar/radial deviation  -TC      Manual Rx 6 Duration 10  -TC      Manual Rx 7    Manual Rx 7 Location proximal radiocarpal joint  -TC      Manual Rx 7 Type ant glide with intermittent distraction into wrist ext PROM improved from 80 to 90 degrees passively    -TC      Manual Rx 7 Grade 3  -TC      Manual Rx 7 Duration 5  -TC        User Key  (r) = Recorded By, (t) = Taken By, (c) = Cosigned By    Initials Name Provider Type    TC Macey Patel, PT Physical Therapist                PT OP Goals       01/23/17 1546 01/20/17 1546 01/18/17 1600    PT Short Term Goals    STG Date to Achieve 02/06/17  -TC 02/06/17  -TC 02/06/17  -HR    STG 1 Patient will demonstrate understanding of precautions post surgery.   -TC Patient will demonstrate understanding of precautions post surgery.   -TC Patient will demonstrate understanding of precautions post surgery.   -HR    STG 1 Progress Ongoing  -TC Ongoing  -TC Ongoing  -HR    STG 2 Pt will demonstrate improved swelling of the L elbow region  within <0.5cm of the unaffected side.   -TC Pt will demonstrate improved swelling of the L elbow region within <0.5cm of the unaffected side.   -TC Pt will demonstrate improved swelling of the L elbow region within <0.5cm of the unaffected side.   -HR    STG 2 Progress Ongoing  -TC Ongoing  -TC Ongoing  -HR    STG 2 Progress Comments 22cm prox to elbow jt line; 22cm distal to jt line   -TC      STG 3 Pt will demonstrate decreased muscle guarding and guarded posturing on the surgical side.   -TC Pt will demonstrate decreased muscle guarding and guarded posturing on the surgical side.   -TC Pt will demonstrate decreased muscle guarding and guarded posturing on the surgical side.   -HR    STG 3 Progress Ongoing  -TC Ongoing  -TC Ongoing  -HR    STG 3 Progress Comments still present   -TC improving, still presents with guarding at times   -TC     STG 4 Pt will demonstrate full PROM on the affected side of the elbow.  -TC Pt will demonstrate full PROM on the affected side of the elbow.  -TC Pt will demonstrate full PROM on the affected side of the elbow.  -HR    STG 4 Progress Ongoing  -TC Ongoing  -TC Ongoing  -HR    STG 4 Progress Comments still limited by 3 deg   -TC working towards joint mobility and mm flexibility in order to improve this  -TC     Long Term Goals    LTG Date to Achieve 03/06/17  -TC 03/06/17  -TC 03/06/17  -HR    LTG 1 Pt will illustrate full PROM of the wrist.   -TC Pt will illustrate full PROM of the wrist.   -TC Pt will illustrate full PROM of the wrist.   -HR    LTG 1 Progress Ongoing  -TC Ongoing  -TC Ongoing  -HR    LTG 1 Progress Comments 90 deg post manual   -TC working towards, still limited with wrist ext mainly; improved from 85 deg passively to 110 post mobs   -TC     LTG 2 Pt will illustrate  strength of >20lbs of the LUE with elbow by side.   -TC Pt will illustrate  strength of >20lbs of the LUE with elbow by side.   -TC Pt will illustrate  strength of >20lbs of the LUE  with elbow by side.   -HR    LTG 2 Progress Ongoing  -TC Ongoing  -TC Ongoing  -HR    LTG 3 Pt will illustrate pinch  of LUE of >5lbs in order to begin to improve her functional ability with ADLs and work related actvity. .  -TC Pt will illustrate pinch  of LUE of >5lbs in order to begin to improve her functional ability with ADLs and work related actvity. .  -TC Pt will illustrate pinch  of LUE of >5lbs in order to begin to improve her functional ability with ADLs and work related actvity. .  -HR    LTG 3 Progress Ongoing  -TC Ongoing  -TC Ongoing  -HR    LTG 4 Pt will report ability to perform typing activity without increased pain and signs of fatigue.   -TC Pt will report ability to perform typing activity without increased pain and signs of fatigue.   -TC Pt will report ability to perform typing activity without increased pain and signs of fatigue.   -HR    LTG 4 Progress Ongoing  -TC Ongoing  -TC Ongoing  -HR    Time Calculation    PT Goal Re-Cert Due Date 02/09/17  -TC 02/09/17  -TC 02/09/17  -HR      01/13/17 1546 01/12/17 0800       PT Short Term Goals    STG Date to Achieve 02/06/17  -TC 02/06/17  -TC     STG 1 Patient will demonstrate understanding of precautions post surgery.   -TC Patient will demonstrate understanding of precautions post surgery.   -TC     STG 1 Progress Ongoing  -TC Ongoing  -TC     STG 1 Progress Comments  reviewed and understood today  -TC     STG 2 Pt will demonstrate improved swelling of the L elbow region within <0.5cm of the unaffected side.   -TC Pt will demonstrate improved swelling of the L elbow region within <0.5cm of the unaffected side.   -TC     STG 2 Progress Ongoing  -TC Ongoing  -TC     STG 2 Progress Comments  22 prox, 21.5 distal to elbow; same today as evaluation   -TC     STG 3 Pt will demonstrate decreased muscle guarding and guarded posturing on the surgical side.   -TC Pt will demonstrate decreased muscle guarding and guarded posturing on the  surgical side.   -TC     STG 3 Progress Ongoing  -TC Ongoing  -TC     STG 3 Progress Comments improving but still guarded and tight limiting ROM   -TC UT, elbow flexors, extensors still tight/guarded; ST restrictions present; wrist extensors slightly tender  -TC     STG 4 Pt will demonstrate full PROM on the affected side of the elbow.  -TC Pt will demonstrate full PROM on the affected side of the elbow.  -TC     STG 4 Progress Ongoing  -TC Ongoing  -TC     STG 4 Progress Comments  worked on today  -TC     Long Term Goals    LTG Date to Achieve 03/06/17  -TC 03/06/17  -TC     LTG 1 Pt will illustrate full PROM of the wrist.   -TC Pt will illustrate full PROM of the wrist.   -TC     LTG 1 Progress Ongoing  -TC Ongoing  -TC     LTG 1 Progress Comments still limited cristo with extension   -TC working towards  -TC     LTG 2 Pt will illustrate  strength of >20lbs of the LUE with elbow by side.   -TC Pt will illustrate  strength of >20lbs of the LUE with elbow by side.   -TC     LTG 2 Progress Ongoing  -TC Ongoing  -TC     LTG 3 Pt will illustrate pinch  of LUE of >5lbs in order to begin to improve her functional ability with ADLs and work related actvity. .  -TC Pt will illustrate pinch  of LUE of >5lbs in order to begin to improve her functional ability with ADLs and work related actvity. .  -TC     LTG 3 Progress Ongoing  -TC Ongoing  -TC     LTG 4 Pt will report ability to perform typing activity without increased pain and signs of fatigue.   -TC Pt will report ability to perform typing activity without increased pain and signs of fatigue.   -TC     LTG 4 Progress Ongoing  -TC Ongoing  -     Time Calculation    PT Goal Re-Cert Due Date 02/09/17  -TC 02/09/17  -TC       User Key  (r) = Recorded By, (t) = Taken By, (c) = Cosigned By    Initials Name Provider Type    HR Elza Paredes, PT Physical Therapist    TC Macey Patel, PT Physical Therapist                Therapy Education        01/23/17 1652    Therapy Education    Given HEP  -TC    Program Reinforced  -TC    How Provided Verbal  -TC    Provided to Patient  -TC    Level of Understanding Verbalized  -TC      01/20/17 1645    Therapy Education    Given HEP;Posture/body mechanics  -TC    Program Reinforced  -TC    How Provided Verbal  -TC    Provided to Patient  -TC    Level of Understanding Verbalized  -TC      User Key  (r) = Recorded By, (t) = Taken By, (c) = Cosigned By    Initials Name Provider Type    TC Macey Patel, PT Physical Therapist                Time Calculation:   Start Time: 1546  Stop Time: 1634  Time Calculation (min): 48 min  Total Timed Code Minutes- PT: 48 minute(s)    Therapy Charges for Today     Code Description Service Date Service Provider Modifiers Qty    85280370524 HC PT MANUAL THERAPY EA 15 MIN 1/23/2017 Macey Patel, PT GP 2    18811841253 HC PT THER PROC EA 15 MIN 1/23/2017 Macey Patel, PT GP 1                    Macey Patel PT  1/23/2017

## 2017-01-24 ENCOUNTER — APPOINTMENT (OUTPATIENT)
Dept: PHYSICAL THERAPY | Facility: HOSPITAL | Age: 47
End: 2017-01-24

## 2017-01-27 ENCOUNTER — HOSPITAL ENCOUNTER (OUTPATIENT)
Dept: PHYSICAL THERAPY | Facility: HOSPITAL | Age: 47
Setting detail: THERAPIES SERIES
Discharge: HOME | End: 2017-01-27

## 2017-01-27 DIAGNOSIS — Z48.89 AFTERCARE FOLLOWING SURGERY: Primary | ICD-10-CM

## 2017-01-27 DIAGNOSIS — S66.812A HAND AND WRIST EXTENSOR TENDON RUPTURE, LEFT, INITIAL ENCOUNTER: ICD-10-CM

## 2017-01-27 PROCEDURE — 97140 MANUAL THERAPY 1/> REGIONS: CPT

## 2017-01-27 NOTE — PROGRESS NOTES
Outpatient Physical Therapy Ortho Treatment Note  [facility]     Patient Name: Maribell Whitley  : 1970  MRN: [id number]  Today's Date: 2017      Visit Date: 2017    Visit Dx:    ICD-10-CM ICD-9-CM   1. Aftercare following surgery Z48.89 V58.89   2. Hand and wrist extensor tendon rupture, left, initial encounter S66.812A 842.10       There is no problem list on file for this patient.       No past medical history on file.     Past Surgical History   Procedure Laterality Date   • Hysterectomy     • Gastric bypass     • Cholecystectomy                               PT Assessment/Plan       17 0715 17 1652 17 1546    PT Assessment    Assessment Comments Patient still presents with ST restrictions on the L limiting her full range but mostly her humeroulnar joint mobility is decreased which is the main cause of her limited elbow flexion. Her elbow extension is now full and painfree but her left wrist continues to be limited with extension. This should improve after she is cleared to decrease use of her brace next week. Her scar tissue was much more mobile today and improved significantlly post IASTM. We will continue to address these impairments in order to restore ROM.   -TC She bumped her L elbow on her dresser in the middle of the night the other night. Her elbow has been tender to touch since then and she has not been doing her scar tissue mobilization. Her L elbow was slightly more swollen today by .5cm, her scar mobility was less compared to last week and her wrist mobility was much more contrained vs last session. She admitted she had been guarding it a bit more. I educated her to hold off one more night on scar mobility but to continue all other exercises. We will continue to address her inflammation, mobility, guarding and restore ROM working towards AAROM.   -TC Her scar tissue mobility is improving quite well. We will measure her swelling next session but not  significant difference was noted today. Her muscle extensibility surrounding the elbow continue to be stiff along with joint mobility of the elbow and wrist on the left but she continues to respond well to manual therapy and stretches. After therapy today her wrist extension improved  from 85 to 100 and her elbow flexion to 125, elbow extension measured at 3. We will continue working to restore her ROM.   -TC    PT Plan    PT Plan Comments Continue to work on joint mobility, ST restrictions and improve scar tissue mobility in order to restore PROM of the elbow and wrist. Slowly progress towards AAROM. She is scheduled to see Surgeon next wednesday.   -TC Continue techniques to improve joint mobility and flexibillity, decrease inflammation and guarding in order to restore PROM. Progressing slowly towards AAROM as tolerated  this week.   -TC We will measure her inflammation next visit, continue to address elbow and wrist joint mobility and flexibility primarily in order to restore full PROM with progression towards protocol towards AROM.   -TC      User Key  (r) = Recorded By, (t) = Taken By, (c) = Cosigned By    Initials Name Provider Type    LUZ Patel, PT Physical Therapist                    Exercises       01/27/17 0757          Subjective Comments    Subjective Comments Pt reported that she   -TC      Subjective Pain    Able to rate subjective pain? yes  -TC      Pre-Treatment Pain Level 0  -TC      Post-Treatment Pain Level 0  -TC      Exercise 1    Exercise Name 1 prone Is   -TC      Cueing 1 Verbal;Tactile;Demo  -TC      Sets 1 3  -TC      Reps 1 10  -TC      Exercise 2    Exercise Name 2 prone middle to lower trap level 1   -TC      Cueing 2 Verbal;Tactile  -TC      Sets 2 3  -TC      Reps 2 10  -TC        User Key  (r) = Recorded By, (t) = Taken By, (c) = Cosigned By    Initials Name Provider Type    LUZ Patel, PT Physical Therapist                        Manual Rx (last 36 hours)       Manual Treatments       01/27/17 0715          Manual Rx 1    Manual Rx 1 Location prone thoracic   -TC      Manual Rx 1 Type ext mob to T5-6 with cavitation noted, pt stated relief afterwards, followed by STM to rhomboids, UT and LS   -TC      Manual Rx 1 Grade 3  -TC      Manual Rx 1 Duration 5  -TC      Manual Rx 2    Manual Rx 2 Location Radial head  -TC      Manual Rx 2 Type rotational mobilizations  -TC      Manual Rx 2 Grade 3  -TC      Manual Rx 2 Duration 5  -TC      Manual Rx 3    Manual Rx 3 Location --  -TC      Manual Rx 3 Type --  -TC      Manual Rx 3 Grade --  -TC      Manual Rx 3 Duration --  -TC      Manual Rx 4    Manual Rx 4 Location scar tissue, L bicep, insertion and mm belly, triceps  -TC      Manual Rx 4 Type IASTM  -TC      Manual Rx 4 Grade light to light moderate  -TC      Manual Rx 4 Duration 10  -TC      Manual Rx 5    Manual Rx 5 Location elbow flexion mob  -TC      Manual Rx 5 Grade 3  -TC      Manual Rx 5 Duration 5  -TC      Manual Rx 6    Manual Rx 6 Location L wrist and fingers  -TC      Manual Rx 6 Type PROM through available range; flex/ext,ulnar/radial deviation  -TC      Manual Rx 6 Duration 10  -TC      Manual Rx 7    Manual Rx 7 Location proximal radiocarpal joint  -TC      Manual Rx 7 Type ant glide with intermittent distraction into wrist ext PROM improved from 85 to 110 degrees passively    -TC      Manual Rx 7 Grade 3  -TC      Manual Rx 7 Duration 5  -TC        User Key  (r) = Recorded By, (t) = Taken By, (c) = Cosigned By    Initials Name Provider Type    TC Macey Patel, PT Physical Therapist                PT OP Goals       01/27/17 0715 01/23/17 1546 01/20/17 1546    PT Short Term Goals    STG Date to Achieve 02/06/17  -TC 02/06/17  -TC 02/06/17  -TC    STG 1 Patient will demonstrate understanding of precautions post surgery.   -TC Patient will demonstrate understanding of precautions post surgery.   -TC Patient will demonstrate understanding of precautions  post surgery.   -TC    STG 1 Progress Ongoing  -TC Ongoing  -TC Ongoing  -TC    STG 2 Pt will demonstrate improved swelling of the L elbow region within <0.5cm of the unaffected side.   -TC Pt will demonstrate improved swelling of the L elbow region within <0.5cm of the unaffected side.   -TC Pt will demonstrate improved swelling of the L elbow region within <0.5cm of the unaffected side.   -TC    STG 2 Progress Ongoing  -TC Ongoing  -TC Ongoing  -TC    STG 2 Progress Comments  22cm prox to elbow jt line; 22cm distal to jt line   -TC     STG 3 Pt will demonstrate decreased muscle guarding and guarded posturing on the surgical side.   -TC Pt will demonstrate decreased muscle guarding and guarded posturing on the surgical side.   -TC Pt will demonstrate decreased muscle guarding and guarded posturing on the surgical side.   -TC    STG 3 Progress Ongoing  -TC Ongoing  -TC Ongoing  -TC    STG 3 Progress Comments L biceps, triceps, brachioradialis ST restrictions still present  -TC still present   -TC improving, still presents with guarding at times   -TC    STG 4 Pt will demonstrate full PROM on the affected side of the elbow.  -TC Pt will demonstrate full PROM on the affected side of the elbow.  -TC Pt will demonstrate full PROM on the affected side of the elbow.  -TC    STG 4 Progress Ongoing  -TC Ongoing  -TC Ongoing  -TC    STG 4 Progress Comments elbow ext -5 and elbow flexion 145 AROM and 141 PROM  -TC still limited by 3 deg   -TC working towards joint mobility and mm flexibility in order to improve this  -TC    Long Term Goals    LTG Date to Achieve 03/06/17  -TC 03/06/17  -TC 03/06/17  -TC    LTG 1 Pt will illustrate full PROM of the wrist.   -TC Pt will illustrate full PROM of the wrist.   -TC Pt will illustrate full PROM of the wrist.   -TC    LTG 1 Progress Ongoing  -TC Ongoing  -TC Ongoing  -TC    LTG 1 Progress Comments 100 today   -TC 90 deg post manual   -TC working towards, still limited with wrist ext  mainly; improved from 85 deg passively to 110 post mobs   -TC    LTG 2 Pt will illustrate  strength of >20lbs of the LUE with elbow by side.   -TC Pt will illustrate  strength of >20lbs of the LUE with elbow by side.   -TC Pt will illustrate  strength of >20lbs of the LUE with elbow by side.   -TC    LTG 2 Progress Ongoing  -TC Ongoing  -TC Ongoing  -TC    LTG 3 Pt will illustrate pinch  of LUE of >5lbs in order to begin to improve her functional ability with ADLs and work related actvity. .  -TC Pt will illustrate pinch  of LUE of >5lbs in order to begin to improve her functional ability with ADLs and work related actvity. .  -TC Pt will illustrate pinch  of LUE of >5lbs in order to begin to improve her functional ability with ADLs and work related actvity. .  -TC    LTG 3 Progress Ongoing  -TC Ongoing  -TC Ongoing  -TC    LTG 4 Pt will report ability to perform typing activity without increased pain and signs of fatigue.   -TC Pt will report ability to perform typing activity without increased pain and signs of fatigue.   -TC Pt will report ability to perform typing activity without increased pain and signs of fatigue.   -TC    LTG 4 Progress Ongoing  -TC Ongoing  -TC Ongoing  -TC    Time Calculation    PT Goal Re-Cert Due Date 02/09/17  -TC 02/09/17  -TC 02/09/17  -TC      01/18/17 1600          PT Short Term Goals    STG Date to Achieve 02/06/17  -HR      STG 1 Patient will demonstrate understanding of precautions post surgery.   -HR      STG 1 Progress Ongoing  -HR      STG 2 Pt will demonstrate improved swelling of the L elbow region within <0.5cm of the unaffected side.   -HR      STG 2 Progress Ongoing  -HR      STG 3 Pt will demonstrate decreased muscle guarding and guarded posturing on the surgical side.   -HR      STG 3 Progress Ongoing  -HR      STG 4 Pt will demonstrate full PROM on the affected side of the elbow.  -HR      STG 4 Progress Ongoing  -HR      Long Term Goals    LTG  Date to Achieve 03/06/17  -HR      LTG 1 Pt will illustrate full PROM of the wrist.   -HR      LTG 1 Progress Ongoing  -HR      LTG 2 Pt will illustrate  strength of >20lbs of the LUE with elbow by side.   -HR      LTG 2 Progress Ongoing  -HR      LTG 3 Pt will illustrate pinch  of LUE of >5lbs in order to begin to improve her functional ability with ADLs and work related actvity. .  -HR      LTG 3 Progress Ongoing  -HR      LTG 4 Pt will report ability to perform typing activity without increased pain and signs of fatigue.   -HR      LTG 4 Progress Ongoing  -HR      Time Calculation    PT Goal Re-Cert Due Date 02/09/17  -HR        User Key  (r) = Recorded By, (t) = Taken By, (c) = Cosigned By    Initials Name Provider Type    HR Elza Paredes, PT Physical Therapist    TC Macey Patel, PT Physical Therapist                Therapy Education       01/27/17 1003    Therapy Education    Given Posture/body mechanics  -TC    Program Reinforced  -TC    How Provided Verbal  -TC    Provided to Patient  -TC    Level of Understanding Verbalized;Demonstrated  -TC      01/23/17 1652    Therapy Education    Given HEP  -TC    Program Reinforced  -TC    How Provided Verbal  -TC    Provided to Patient  -TC    Level of Understanding Verbalized  -TC      01/20/17 1645    Therapy Education    Given HEP;Posture/body mechanics  -TC    Program Reinforced  -TC    How Provided Verbal  -TC    Provided to Patient  -TC    Level of Understanding Verbalized  -TC      User Key  (r) = Recorded By, (t) = Taken By, (c) = Cosigned By    Initials Name Provider Type    TC Macey Patel, PT Physical Therapist                Time Calculation:   Start Time: 0715  Stop Time: 0804  Time Calculation (min): 49 min  Total Timed Code Minutes- PT: 49 minute(s)    Therapy Charges for Today     Code Description Service Date Service Provider Modifiers Qty    [id number] HC PT MANUAL THERAPY EA 15 MIN 1/27/2017 Macey Patel, PT GP 3                     Macey Patel, PT  1/27/2017

## 2017-01-30 ENCOUNTER — HOSPITAL ENCOUNTER (OUTPATIENT)
Dept: PHYSICAL THERAPY | Facility: HOSPITAL | Age: 47
Setting detail: THERAPIES SERIES
Discharge: HOME | End: 2017-01-30

## 2017-01-30 DIAGNOSIS — S66.812A HAND AND WRIST EXTENSOR TENDON RUPTURE, LEFT, INITIAL ENCOUNTER: ICD-10-CM

## 2017-01-30 DIAGNOSIS — Z48.89 AFTERCARE FOLLOWING SURGERY: Primary | ICD-10-CM

## 2017-01-30 PROCEDURE — 97140 MANUAL THERAPY 1/> REGIONS: CPT | Performed by: PHYSICAL THERAPIST

## 2017-01-30 NOTE — PROGRESS NOTES
Outpatient Physical Therapy Ortho Treatment Note  [facility]     Patient Name: Maribell Whitley  : 1970  MRN: [id number]  Today's Date: 2017      Visit Date: 2017    Visit Dx:    ICD-10-CM ICD-9-CM   1. Aftercare following surgery Z48.89 V58.89   2. Hand and wrist extensor tendon rupture, left, initial encounter S66.812A 842.10       There is no problem list on file for this patient.       No past medical history on file.     Past Surgical History   Procedure Laterality Date   • Hysterectomy     • Gastric bypass     • Cholecystectomy  2016             PT Ortho       17 1600    Subjective Comments    Subjective Comments She is going to the surgeon on Wednesday and hoping to get rid of the splint and start strengthening.   -HR    Precautions and Contraindications    Precautions no strengthening; No active wrist ROM for 4 weeks, splint for 6-8 weeks   -HR    Subjective Pain    Able to rate subjective pain? yes  -HR    Pre-Treatment Pain Level 0  -HR    Post-Treatment Pain Level 0  -HR    Subjective Pain Comment wakes up in AM with stiffness but no other complaints.   -HR    Left Elbow/Forearm    Extension/Flexion PROM Deficit +2 to 152  -HR    Left Wrist    Extension PROM Deficit most tightness felt with wrist extension while holding fingers in extension as well. Stretch over ventral wrist.   -HR      User Key  (r) = Recorded By, (t) = Taken By, (c) = Cosigned By    Initials Name Provider Type    HR Elza Paredes, PT Physical Therapist                            PT Assessment/Plan       17 1600 17 0715 17 1652    PT Assessment    Assessment Comments She is continuing to progress with ROM today. Her elbow is not as limited into flexion and she can easily put her left hand to her head, mouth, etc. She should get the brace off this week which will allow more normal wrist mobility as the extension with fingers straight is quite uncomfortable for her.   -HR Patient still  presents with ST restrictions on the L limiting her full range but mostly her humeroulnar joint mobility is decreased which is the main cause of her limited elbow flexion. Her elbow extension is now full and painfree but her left wrist continues to be limited with extension. This should improve after she is cleared to decrease use of her brace next week. Her scar tissue was much more mobile today and improved significantlly post IASTM. We will continue to address these impairments in order to restore ROM.   -TC She bumped her L elbow on her dresser in the middle of the night the other night. Her elbow has been tender to touch since then and she has not been doing her scar tissue mobilization. Her L elbow was slightly more swollen today by .5cm, her scar mobility was less compared to last week and her wrist mobility was much more contrained vs last session. She admitted she had been guarding it a bit more. I educated her to hold off one more night on scar mobility but to continue all other exercises. We will continue to address her inflammation, mobility, guarding and restore ROM working towards AAROM.   -TC    PT Plan    PT Plan Comments Continue with current plan and reassess when restrictions changed or lifted later this week.   -HR Continue to work on joint mobility, ST restrictions and improve scar tissue mobility in order to restore PROM of the elbow and wrist. Slowly progress towards AAROM. She is scheduled to see Surgeon next wednesday.   -TC Continue techniques to improve joint mobility and flexibillity, decrease inflammation and guarding in order to restore PROM. Progressing slowly towards AAROM as tolerated  this week.   -TC      User Key  (r) = Recorded By, (t) = Taken By, (c) = Cosigned By    Initials Name Provider Type    HR Elza Paredes, PT Physical Therapist    TC Macey Patel, PT Physical Therapist                    Exercises       01/30/17 1600          Subjective Comments     Subjective Comments She is going to the surgeon on Wednesday and hoping to get rid of the splint and start strengthening.   -HR      Subjective Pain    Able to rate subjective pain? yes  -HR      Pre-Treatment Pain Level 0  -HR      Post-Treatment Pain Level 0  -HR      Subjective Pain Comment wakes up in AM with stiffness but no other complaints.   -HR        User Key  (r) = Recorded By, (t) = Taken By, (c) = Cosigned By    Initials Name Provider Type    HR Elza Paredes, PT Physical Therapist                        Manual Rx (last 36 hours)      Manual Treatments       01/30/17 1500          Manual Rx 1    Manual Rx 1 Location prone thoracic   -HR      Manual Rx 1 Type ext mob to T-spine with 2 cavitation noted, pt stated relief afterwards, followed by STM to rhomboids, UT and LS   -HR      Manual Rx 1 Grade 3  -HR      Manual Rx 1 Duration 5  -HR      Manual Rx 2    Manual Rx 2 Location Radial head  -HR      Manual Rx 2 Type rotational mobilizations  -HR      Manual Rx 2 Grade 3  -HR      Manual Rx 2 Duration 5  -HR      Manual Rx 4    Manual Rx 4 Location scar tissue, L bicep, insertion and mm belly, triceps  -HR      Manual Rx 4 Type manual  -HR      Manual Rx 4 Grade moderate  -HR      Manual Rx 4 Duration 10  -HR      Manual Rx 5    Manual Rx 5 Location elbow flexion mob  -HR      Manual Rx 5 Grade 3  -HR      Manual Rx 5 Duration 5  -HR      Manual Rx 6    Manual Rx 6 Location L wrist and fingers  -HR      Manual Rx 6 Type PROM through available range; flex/ext,ulnar/radial deviation  -HR      Manual Rx 6 Duration 10  -HR      Manual Rx 7    Manual Rx 7 Location proximal radiocarpal joint  -HR      Manual Rx 7 Type ant glide with intermittent distraction into wrist ext PROM improved from 85 to 110 degrees passively    -HR      Manual Rx 7 Grade 3  -HR      Manual Rx 7 Duration 5  -HR        User Key  (r) = Recorded By, (t) = Taken By, (c) = Cosigned By    Initials Name Provider Type    HR Elza Phillips  Reggie, PT Physical Therapist                PT OP Goals       01/30/17 1600 01/27/17 0715 01/23/17 1546    PT Short Term Goals    STG Date to Achieve 02/06/17  -HR 02/06/17  -TC 02/06/17  -TC    STG 1 Patient will demonstrate understanding of precautions post surgery.   -HR Patient will demonstrate understanding of precautions post surgery.   -TC Patient will demonstrate understanding of precautions post surgery.   -TC    STG 1 Progress Ongoing  -HR Ongoing  -TC Ongoing  -TC    STG 2 Pt will demonstrate improved swelling of the L elbow region within <0.5cm of the unaffected side.   -HR Pt will demonstrate improved swelling of the L elbow region within <0.5cm of the unaffected side.   -TC Pt will demonstrate improved swelling of the L elbow region within <0.5cm of the unaffected side.   -TC    STG 2 Progress Ongoing  -HR Ongoing  -TC Ongoing  -TC    STG 2 Progress Comments   22cm prox to elbow jt line; 22cm distal to jt line   -TC    STG 3 Pt will demonstrate decreased muscle guarding and guarded posturing on the surgical side.   -HR Pt will demonstrate decreased muscle guarding and guarded posturing on the surgical side.   -TC Pt will demonstrate decreased muscle guarding and guarded posturing on the surgical side.   -TC    STG 3 Progress Ongoing  -HR Ongoing  -TC Ongoing  -TC    STG 3 Progress Comments  L biceps, triceps, brachioradialis ST restrictions still present  -TC still present   -TC    STG 4 Pt will demonstrate full PROM on the affected side of the elbow.  -HR Pt will demonstrate full PROM on the affected side of the elbow.  -TC Pt will demonstrate full PROM on the affected side of the elbow.  -TC    STG 4 Progress Ongoing  -HR Ongoing  -TC Ongoing  -TC    STG 4 Progress Comments +2 hyperextension and 152 flexion PROM  -HR elbow ext -5 and elbow flexion 145 AROM and 141 PROM  -TC still limited by 3 deg   -TC    Long Term Goals    LTG Date to Achieve 03/06/17  -HR 03/06/17  -TC 03/06/17  -TC    LTG 1 Pt  will illustrate full PROM of the wrist.   -HR Pt will illustrate full PROM of the wrist.   -TC Pt will illustrate full PROM of the wrist.   -TC    LTG 1 Progress Ongoing  -HR Ongoing  -TC Ongoing  -TC    LTG 1 Progress Comments  100 today   -TC 90 deg post manual   -TC    LTG 2 Pt will illustrate  strength of >20lbs of the LUE with elbow by side.   -HR Pt will illustrate  strength of >20lbs of the LUE with elbow by side.   -TC Pt will illustrate  strength of >20lbs of the LUE with elbow by side.   -TC    LTG 2 Progress Ongoing  -HR Ongoing  -TC Ongoing  -TC    LTG 3 Pt will illustrate pinch  of LUE of >5lbs in order to begin to improve her functional ability with ADLs and work related actvity. .  -HR Pt will illustrate pinch  of LUE of >5lbs in order to begin to improve her functional ability with ADLs and work related actvity. .  -TC Pt will illustrate pinch  of LUE of >5lbs in order to begin to improve her functional ability with ADLs and work related actvity. .  -TC    LTG 3 Progress Ongoing  -HR Ongoing  -TC Ongoing  -TC    LTG 4 Pt will report ability to perform typing activity without increased pain and signs of fatigue.   -HR Pt will report ability to perform typing activity without increased pain and signs of fatigue.   -TC Pt will report ability to perform typing activity without increased pain and signs of fatigue.   -TC    LTG 4 Progress Ongoing  -HR Ongoing  -TC Ongoing  -TC    Time Calculation    PT Goal Re-Cert Due Date  02/09/17  -TC 02/09/17  -TC      01/20/17 1546 01/18/17 1600       PT Short Term Goals    STG Date to Achieve 02/06/17  -TC 02/06/17  -HR     STG 1 Patient will demonstrate understanding of precautions post surgery.   -TC Patient will demonstrate understanding of precautions post surgery.   -HR     STG 1 Progress Ongoing  -TC Ongoing  -HR     STG 2 Pt will demonstrate improved swelling of the L elbow region within <0.5cm of the unaffected side.   -TC Pt will  demonstrate improved swelling of the L elbow region within <0.5cm of the unaffected side.   -HR     STG 2 Progress Ongoing  -TC Ongoing  -HR     STG 3 Pt will demonstrate decreased muscle guarding and guarded posturing on the surgical side.   -TC Pt will demonstrate decreased muscle guarding and guarded posturing on the surgical side.   -HR     STG 3 Progress Ongoing  -TC Ongoing  -HR     STG 3 Progress Comments improving, still presents with guarding at times   -TC      STG 4 Pt will demonstrate full PROM on the affected side of the elbow.  -TC Pt will demonstrate full PROM on the affected side of the elbow.  -HR     STG 4 Progress Ongoing  -TC Ongoing  -HR     STG 4 Progress Comments working towards joint mobility and mm flexibility in order to improve this  -TC      Long Term Goals    LTG Date to Achieve 03/06/17  -TC 03/06/17  -HR     LTG 1 Pt will illustrate full PROM of the wrist.   -TC Pt will illustrate full PROM of the wrist.   -HR     LTG 1 Progress Ongoing  -TC Ongoing  -HR     LTG 1 Progress Comments working towards, still limited with wrist ext mainly; improved from 85 deg passively to 110 post mobs   -TC      LTG 2 Pt will illustrate  strength of >20lbs of the LUE with elbow by side.   -TC Pt will illustrate  strength of >20lbs of the LUE with elbow by side.   -HR     LTG 2 Progress Ongoing  -TC Ongoing  -HR     LTG 3 Pt will illustrate pinch  of LUE of >5lbs in order to begin to improve her functional ability with ADLs and work related actvity. .  -TC Pt will illustrate pinch  of LUE of >5lbs in order to begin to improve her functional ability with ADLs and work related actvity. .  -HR     LTG 3 Progress Ongoing  -TC Ongoing  -HR     LTG 4 Pt will report ability to perform typing activity without increased pain and signs of fatigue.   -TC Pt will report ability to perform typing activity without increased pain and signs of fatigue.   -HR     LTG 4 Progress Ongoing  -TC Ongoing  -HR      Time Calculation    PT Goal Re-Cert Due Date 02/09/17  -TC 02/09/17  -HR       User Key  (r) = Recorded By, (t) = Taken By, (c) = Cosigned By    Initials Name Provider Type    HR Elza Paredes, PT Physical Therapist    TC Macey Patel, PT Physical Therapist                Therapy Education       01/27/17 1003    Therapy Education    Given Posture/body mechanics  -TC    Program Reinforced  -TC    How Provided Verbal  -TC    Provided to Patient  -TC    Level of Understanding Verbalized;Demonstrated  -TC      01/23/17 1652    Therapy Education    Given HEP  -TC    Program Reinforced  -TC    How Provided Verbal  -TC    Provided to Patient  -TC    Level of Understanding Verbalized  -TC      User Key  (r) = Recorded By, (t) = Taken By, (c) = Cosigned By    Initials Name Provider Type    TC Macey Patel, PT Physical Therapist                Time Calculation:   Start Time: 1545  Stop Time: 1630  Time Calculation (min): 45 min  Total Timed Code Minutes- PT: 45 minute(s)    Therapy Charges for Today     Code Description Service Date Service Provider Modifiers Qty    [id number] HC PT MANUAL THERAPY EA 15 MIN 1/30/2017 Elza Paredes, PT GP 3                    Elza Paredes, PT  1/30/2017

## 2017-01-31 ENCOUNTER — HOSPITAL ENCOUNTER (OUTPATIENT)
Dept: PHYSICAL THERAPY | Facility: HOSPITAL | Age: 47
Setting detail: THERAPIES SERIES
Discharge: HOME | End: 2017-01-31

## 2017-01-31 DIAGNOSIS — S66.812A HAND AND WRIST EXTENSOR TENDON RUPTURE, LEFT, INITIAL ENCOUNTER: ICD-10-CM

## 2017-01-31 DIAGNOSIS — Z48.89 AFTERCARE FOLLOWING SURGERY: Primary | ICD-10-CM

## 2017-01-31 PROCEDURE — 97140 MANUAL THERAPY 1/> REGIONS: CPT

## 2017-02-03 ENCOUNTER — HOSPITAL ENCOUNTER (OUTPATIENT)
Dept: PHYSICAL THERAPY | Facility: HOSPITAL | Age: 47
Setting detail: THERAPIES SERIES
Discharge: HOME | End: 2017-02-03

## 2017-02-03 DIAGNOSIS — S66.812A HAND AND WRIST EXTENSOR TENDON RUPTURE, LEFT, INITIAL ENCOUNTER: ICD-10-CM

## 2017-02-03 DIAGNOSIS — Z48.89 AFTERCARE FOLLOWING SURGERY: Primary | ICD-10-CM

## 2017-02-03 PROCEDURE — 97140 MANUAL THERAPY 1/> REGIONS: CPT

## 2017-02-03 PROCEDURE — 97110 THERAPEUTIC EXERCISES: CPT

## 2017-02-03 NOTE — PROGRESS NOTES
"    Outpatient Physical Therapy Ortho Treatment Note  [facility]     Patient Name: Maribell Whitley  : 1970  MRN: [id number]  Today's Date: 2/3/2017      Visit Date: 2017    Visit Dx:    ICD-10-CM ICD-9-CM   1. Aftercare following surgery Z48.89 V58.89   2. Hand and wrist extensor tendon rupture, left, initial encounter S66.812A 842.10       There is no problem list on file for this patient.       History reviewed. No pertinent past medical history.     Past Surgical History   Procedure Laterality Date   • Hysterectomy     • Gastric bypass     • Cholecystectomy                               PT Assessment/Plan       17 1702 17 1701 17 1600    PT Assessment    Assessment Comments Patient reported a positive follow up with her Orthopedic Surgeon, he wrote a new order for strengthening as tolerated and requested she not lift more than 10lbs for the next 6 weeks. Now that she has been using her wrist for functional activities she noticed that doffing her shirt overhead has caused L shoulder pain. I assessed her shoulder and she appears to have GH inferior capsule hypomobility along with diminished scapular upward rotation with overhead movement and I believe this is contributing to the \"catching\" pain she described. After jt mobilization and thoracic mobilization she reported less catching and her mechanaics normalized. Her elbow joint and wrist continue to be slightly stiff limiting PROM and AROM and therefore we will continue to address these impairments. I slightly progress her active wrist extensor exercises today.   -TC Her wrist and elbow mobility are improving but still limited into mainly elbow flexion and wrist extension and radial deviation. She demonstrates some continued bicep and brachioradialis fibrotic lesions as well that refer pain upon palpation. Her ROM is almost equal compared to her other side and therefore, pending her follow up with her surgeon tomorrow, I believe " she is ready for more AAROM activities and lower level AROM/strengthening.   -TC She is continuing to progress with ROM today. Her elbow is not as limited into flexion and she can easily put her left hand to her head, mouth, etc. She should get the brace off this week which will allow more normal wrist mobility as the extension with fingers straight is quite uncomfortable for her.   -HR    PT Plan    PT Plan Comments Assess the effects of today's session, reassess her shoulder and address her hypomobility proximal to distal with possible need for scapular mobilization. We will continue to progress her as tolerated towards AAROM and AROM without exacerbating symptoms and following healing/strengthening protocols.   -TC Follow up with her Dr appointment. Progress as indication with AAROM and AROM activities.   -TC Continue with current plan and reassess when restrictions changed or lifted later this week.   -HR      User Key  (r) = Recorded By, (t) = Taken By, (c) = Cosigned By    Initials Name Provider Type    HR Elza Paredes, PT Physical Therapist    TC Macey Patel, PT Physical Therapist                    Exercises       02/03/17 4963          Subjective Comments    Subjective Comments Patient reported that her follow up went well with the Ortho Surgeon  -TC      Subjective Pain    Able to rate subjective pain? yes  -TC      Pre-Treatment Pain Level 2  -TC      Post-Treatment Pain Level 0  -TC      Subjective Pain Comment R shoulder with reaching overhead adduction   -TC      Exercise 1    Exercise Name 1 active assistive ext and flex to neutral in supination with forearm supported by wedge    L  -TC      Cueing 1 Verbal;Tactile;Demo  -TC      Sets 1 3  -TC      Reps 1 15  -TC      Exercise 2    Exercise Name 2 eccentric gripping sm green ball,  wrist in neutral    L   -TC      Cueing 2 Verbal;Tactile;Demo  -TC      Sets 2 2  -TC      Reps 2 10  -TC      Exercise 3    Exercise Name 3 Wt shifting in  plantargrade R to left, elbow extended    focus on L; no sustained holds just 1s or less; painfree  -TC      Cueing 3 Verbal;Tactile;Demo  -TC      Sets 3 2  -TC      Reps 3 10  -TC      Exercise 4    Exercise Name 4 gave active wrist ext for HEP   -TC      Exercise 5    Exercise Name 5 --  -TC        User Key  (r) = Recorded By, (t) = Taken By, (c) = Cosigned By    Initials Name Provider Type    TC Macey Patel, PT Physical Therapist                        Manual Rx (last 36 hours)      Manual Treatments       02/03/17 1546          Manual Rx 1    Manual Rx 1 Location L shoulder; hooklying   -TC      Manual Rx 1 Type inf GH mobs  -TC      Manual Rx 1 Grade 3  -TC      Manual Rx 1 Duration 8  -TC      Manual Rx 2    Manual Rx 2 Location Radial head  -TC      Manual Rx 2 Type rotational mobilizations  -TC      Manual Rx 2 Grade 3  -TC      Manual Rx 2 Duration 5  -TC      Manual Rx 3    Manual Rx 3 Location wrist distraction into flexion and extension   -TC      Manual Rx 3 Duration 5  -TC      Manual Rx 4    Manual Rx 4 Location prone thoracic upper and mid  -TC      Manual Rx 4 Type ext mob    1 cavitation noted at T6  -TC      Manual Rx 4 Grade 3  -TC      Manual Rx 4 Duration 5  -TC      Manual Rx 5    Manual Rx 5 Location elbow flexion mob  -TC      Manual Rx 5 Grade 3  -TC      Manual Rx 5 Duration 5  -TC      Manual Rx 7    Manual Rx 7 Location proximal radiocarpal joint  -TC      Manual Rx 7 Type ant glide with intermittent distraction into wrist ext PROM improved from 85 to 110 degrees passively    -TC      Manual Rx 7 Grade 3  -TC      Manual Rx 7 Duration 5  -TC        User Key  (r) = Recorded By, (t) = Taken By, (c) = Cosigned By    Initials Name Provider Type    TC Macey Patel, PT Physical Therapist                PT OP Goals       02/03/17 1546 01/31/17 1550 01/30/17 1600    PT Short Term Goals    STG Date to Achieve 02/06/17  -TC 02/06/17  -TC 02/06/17  -HR    STG 1 Patient will  demonstrate understanding of precautions post surgery.   -TC Patient will demonstrate understanding of precautions post surgery.   -TC Patient will demonstrate understanding of precautions post surgery.   -HR    STG 1 Progress Partially Met  -TC Ongoing  -TC Ongoing  -HR    STG 1 Progress Comments Pt understands her 10lb restrictions at this time   -TC      STG 2 Pt will demonstrate improved swelling of the L elbow region within <0.5cm of the unaffected side.   -TC Pt will demonstrate improved swelling of the L elbow region within <0.5cm of the unaffected side.   -TC Pt will demonstrate improved swelling of the L elbow region within <0.5cm of the unaffected side.   -HR    STG 2 Progress Ongoing  -TC Ongoing  -TC Ongoing  -HR    STG 3 Pt will demonstrate decreased muscle guarding and guarded posturing on the surgical side.   -TC Pt will demonstrate decreased muscle guarding and guarded posturing on the surgical side.   -TC Pt will demonstrate decreased muscle guarding and guarded posturing on the surgical side.   -HR    STG 3 Progress Ongoing  -TC Ongoing  -TC Ongoing  -HR    STG 3 Progress Comments still guarded but much improved at biceps and brachiorad and wrist extensors  -TC improving but still limited with scar tissue mobility and fibrotic lesions in biceps and brachioradialis  -TC     STG 4 Pt will demonstrate full PROM on the affected side of the elbow.  -TC Pt will demonstrate full PROM on the affected side of the elbow.  -TC Pt will demonstrate full PROM on the affected side of the elbow.  -HR    STG 4 Progress Ongoing  -TC Ongoing  -TC Ongoing  -HR    STG 4 Progress Comments still limited by about 7 degrees  -TC pronation 70  supination 90  -TC +2 hyperextension and 152 flexion PROM  -HR    Long Term Goals    LTG Date to Achieve 03/06/17  -TC 03/06/17  -TC 03/06/17  -HR    LTG 1 Pt will illustrate full PROM of the wrist.   -TC Pt will illustrate full PROM of the wrist.   -TC Pt will illustrate full PROM of  the wrist.   -HR    LTG 1 Progress Ongoing  -TC Ongoing  -TC Ongoing  -HR    LTG 1 Progress Comments still limited with wrist ext by 25% of motion; AROM limited by 50%   -TC ulnar deviation 11, radial dev 5  -TC     LTG 2 Pt will illustrate  strength of >20lbs of the LUE with elbow by side.   -TC Pt will illustrate  strength of >20lbs of the LUE with elbow by side.   -TC Pt will illustrate  strength of >20lbs of the LUE with elbow by side.   -HR    LTG 2 Progress Ongoing  -TC Ongoing  -TC Ongoing  -HR    LTG 3 Pt will illustrate pinch  of LUE of >5lbs in order to begin to improve her functional ability with ADLs and work related actvity. .  -TC Pt will illustrate pinch  of LUE of >5lbs in order to begin to improve her functional ability with ADLs and work related actvity. .  -TC Pt will illustrate pinch  of LUE of >5lbs in order to begin to improve her functional ability with ADLs and work related actvity. .  -HR    LTG 3 Progress Ongoing  -TC Ongoing  -TC Ongoing  -HR    LTG 4 Pt will report ability to perform typing activity without increased pain and signs of fatigue.   -TC Pt will report ability to perform typing activity without increased pain and signs of fatigue.   -TC Pt will report ability to perform typing activity without increased pain and signs of fatigue.   -HR    LTG 4 Progress Met  -TC Ongoing  -TC Ongoing  -HR    LTG 4 Progress Comments She reports no pain with typing activity  -TC she reports she has had no issues with work related activities such as typing but will feel sore afterwards   -TC     LTG 5 Pt will report no pain with lifting <10lb objects at home and work or opening objects.   -TC      LTG 5 Progress New  -TC      LTG 6 Pt will demonstrate MMT of wrist extensors to 4/5 before discharge in order to return to her PLOF.   -TC      LTG 6 Progress New  -TC      Time Calculation    PT Goal Re-Cert Due Date 02/09/17  -TC 02/09/17  -TC       01/27/17 0715 01/23/17 1546        PT Short Term Goals    STG Date to Achieve 02/06/17  -TC 02/06/17  -TC     STG 1 Patient will demonstrate understanding of precautions post surgery.   -TC Patient will demonstrate understanding of precautions post surgery.   -TC     STG 1 Progress Ongoing  -TC Ongoing  -TC     STG 2 Pt will demonstrate improved swelling of the L elbow region within <0.5cm of the unaffected side.   -TC Pt will demonstrate improved swelling of the L elbow region within <0.5cm of the unaffected side.   -TC     STG 2 Progress Ongoing  -TC Ongoing  -TC     STG 2 Progress Comments  22cm prox to elbow jt line; 22cm distal to jt line   -TC     STG 3 Pt will demonstrate decreased muscle guarding and guarded posturing on the surgical side.   -TC Pt will demonstrate decreased muscle guarding and guarded posturing on the surgical side.   -TC     STG 3 Progress Ongoing  -TC Ongoing  -TC     STG 3 Progress Comments L biceps, triceps, brachioradialis ST restrictions still present  -TC still present   -TC     STG 4 Pt will demonstrate full PROM on the affected side of the elbow.  -TC Pt will demonstrate full PROM on the affected side of the elbow.  -TC     STG 4 Progress Ongoing  -TC Ongoing  -TC     STG 4 Progress Comments elbow ext -5 and elbow flexion 145 AROM and 141 PROM  -TC still limited by 3 deg   -TC     Long Term Goals    LTG Date to Achieve 03/06/17  -TC 03/06/17  -TC     LTG 1 Pt will illustrate full PROM of the wrist.   -TC Pt will illustrate full PROM of the wrist.   -TC     LTG 1 Progress Ongoing  -TC Ongoing  -TC     LTG 1 Progress Comments 100 today   -TC 90 deg post manual   -TC     LTG 2 Pt will illustrate  strength of >20lbs of the LUE with elbow by side.   -TC Pt will illustrate  strength of >20lbs of the LUE with elbow by side.   -TC     LTG 2 Progress Ongoing  -TC Ongoing  -TC     LTG 3 Pt will illustrate pinch  of LUE of >5lbs in order to begin to improve her functional ability with ADLs and work related  actvity. .  -TC Pt will illustrate pinch  of LUE of >5lbs in order to begin to improve her functional ability with ADLs and work related actvity. .  -TC     LTG 3 Progress Ongoing  -TC Ongoing  -TC     LTG 4 Pt will report ability to perform typing activity without increased pain and signs of fatigue.   -TC Pt will report ability to perform typing activity without increased pain and signs of fatigue.   -TC     LTG 4 Progress Ongoing  -TC Ongoing  -TC     Time Calculation    PT Goal Re-Cert Due Date 02/09/17  -TC 02/09/17  -TC       User Key  (r) = Recorded By, (t) = Taken By, (c) = Cosigned By    Initials Name Provider Type    HR Elza Paredes, PT Physical Therapist    TC Macey Patel, PT Physical Therapist                Therapy Education       02/03/17 1700    Therapy Education    Given HEP;Posture/body mechanics   educated on postural awareness to avoid LUE scapular and shoulder impairments along with elbow and wrist.   -TC    Program New   Active wrist extension added to HEP and weight shifting onto L in plantargrade   -TC    How Provided Verbal;Demonstration  -TC    Provided to Patient  -TC    Level of Understanding Verbalized;Demonstrated  -TC      01/31/17 1700    Therapy Education    Given HEP;Posture/body mechanics   gave print out of her ROM for Drs appt   -TC    Program New   added PROM wrist flex/ext elbow supported   -TC    How Provided Verbal;Demonstration  -TC    Provided to Patient  -TC    Level of Understanding Verbalized;Demonstrated  -TC      User Key  (r) = Recorded By, (t) = Taken By, (c) = Cosigned By    Initials Name Provider Type    TC Macey Patel, PT Physical Therapist                Time Calculation:   Start Time: 1546  Stop Time: 1634  Time Calculation (min): 48 min  Total Timed Code Minutes- PT: 48 minute(s)    Therapy Charges for Today     Code Description Service Date Service Provider Modifiers Qty    62777110472 HC PT MANUAL THERAPY EA 15 MIN 2/3/2017 Macey  Zeina Patel, PT GP 2    [id number]  PT THER PROC EA 15 MIN 2/3/2017 Macey Patel, PT GP 1                    Macey Patel, PT  2/3/2017

## 2017-02-06 ENCOUNTER — APPOINTMENT (OUTPATIENT)
Dept: PHYSICAL THERAPY | Facility: HOSPITAL | Age: 47
End: 2017-02-06

## 2017-02-10 ENCOUNTER — HOSPITAL ENCOUNTER (OUTPATIENT)
Dept: PHYSICAL THERAPY | Facility: HOSPITAL | Age: 47
Setting detail: THERAPIES SERIES
Discharge: HOME | End: 2017-02-10

## 2017-02-10 DIAGNOSIS — Z48.89 AFTERCARE FOLLOWING SURGERY: Primary | ICD-10-CM

## 2017-02-10 DIAGNOSIS — S66.812A HAND AND WRIST EXTENSOR TENDON RUPTURE, LEFT, INITIAL ENCOUNTER: ICD-10-CM

## 2017-02-10 PROCEDURE — 97110 THERAPEUTIC EXERCISES: CPT

## 2017-02-10 PROCEDURE — 97140 MANUAL THERAPY 1/> REGIONS: CPT

## 2017-02-10 NOTE — PROGRESS NOTES
Outpatient Physical Therapy Ortho Treatment Note   [place]     Patient Name: Maribell Whitley  : 1970  MRN: [id number]  Today's Date: 2/10/2017      Visit Date: 02/10/2017    Visit Dx:    ICD-10-CM ICD-9-CM   1. Aftercare following surgery Z48.89 V58.89   2. Hand and wrist extensor tendon rupture, left, initial encounter S66.812A 842.10       There is no problem list on file for this patient.       No past medical history on file.     Past Surgical History   Procedure Laterality Date   • Hysterectomy     • Gastric bypass     • Cholecystectomy                               PT Assessment/Plan       02/10/17 1552 17 1702       PT Assessment    Functional Limitations Limitation in home management;Limitations in community activities;Performance in leisure activities;Performance in self-care ADL  -TC      Impairments Impaired flexibility;Impaired muscle endurance;Impaired muscle length;Impaired neuromotor development;Impaired muscle power;Muscle strength;Pain;Range of motion  -TC      Assessment Comments Pt has met 2/10 goals at this time. She has met her elbow ROM goals and almost met her wrist mobility and ROM goals. Her wrist extension and ulnar deviation are most limited both passively and actively although significantly improved. I was able to assess her  and pinch  on the L today and therefore we have a baseline measure at this time. Her edema is almost dissipated and I am quite pleased with her scar mobility as well. We are carefully progressing her with L wrist AROM and strengthening activites both concentrically and eccentrically but still focusing on passive mobility as well and static/dynamic stabilization as well following protocol and 10lb weight limit. She has progressed quite well and I am very pleased with her gains.    -TC Patient reported a positive follow up with her Orthopedic Surgeon, he wrote a new order for strengthening as tolerated and requested she not lift more  "than 10lbs for the next 6 weeks. Now that she has been using her wrist for functional activities she noticed that doffing her shirt overhead has caused L shoulder pain. I assessed her shoulder and she appears to have GH inferior capsule hypomobility along with diminished scapular upward rotation with overhead movement and I believe this is contributing to the \"catching\" pain she described. After jt mobilization and thoracic mobilization she reported less catching and her mechanaics normalized. Her elbow joint and wrist continue to be slightly stiff limiting PROM and AROM and therefore we will continue to address these impairments. I slightly progress her active wrist extensor exercises today.   -TC     Please refer to paper survey for additional self-reported information Yes  -TC      Rehab Potential Good  -TC      Patient/caregiver participated in establishment of treatment plan and goals Yes  -TC      Patient would benefit from skilled therapy intervention Yes  -TC      PT Plan    PT Frequency 2x/week  -TC      Predicted Duration of Therapy Intervention (days/wks) 6 weeks   -TC      Planned CPT's? PT THER PROC EA 15 MIN: 68199;PT THER ACT EA 15 MIN: 96641;PT MANUAL THERAPY EA 15 MIN: 46890;PT NEUROMUSC RE-EDUCATION EA 15 MIN: 54110;PT ELECTRICAL STIM ATTD EA 15 MIN: 82151;PT ELECTRICAL STIM UNATTEND:   -TC      Physical Therapy Interventions (Optional Details) transfer training;taping;gross motor skills;neuromuscular re-education;motor coordination training;modalities;manual therapy techniques;joint mobilization;home exercise program;stretching;strengthening  -TC      PT Plan Comments During today's session I did slightly progress her so we will assess the effects of progression next session. We will reassess her shoulder once again and continue with wrist mobility, wrist/elbow ROM and progress towards stability and strengthening exercises according to protocol and healing phases.   -TC Assess the effects of " today's session, reassess her shoulder and address her hypomobility proximal to distal with possible need for scapular mobilization. We will continue to progress her as tolerated towards AAROM and AROM without exacerbating symptoms and following healing/strengthening protocols.   -TC       User Key  (r) = Recorded By, (t) = Taken By, (c) = Cosigned By    Initials Name Provider Type    TC Macey Patel, PT Physical Therapist                    Exercises       02/10/17 1532          Subjective Comments    Subjective Comments Pt feels she is doing pretty good   -TC      Subjective Pain    Able to rate subjective pain? yes  -TC      Pre-Treatment Pain Level 0  -TC      Exercise 1    Exercise Name 1 Reassessment performed all goals addressed.   -TC      Exercise 2    Exercise Name 2 wrist flex/ext on wedge 1lb weight   -TC      Cueing 2 Verbal;Tactile;Demo  -TC      Equipment 2 Dumbell  -TC      Weights/Plates 2 1  -TC      Sets 2 3  -TC      Reps 2 15  -TC      Time (Minutes) 2 both; 3 mins  -TC      Exercise 3    Exercise Name 3 resisted wrist ext with eccentric wrist flexion (elbow resting on table)   elbow resting on table   -TC      Cueing 3 Verbal;Tactile;Demo  -TC      Equipment 3 Theraband  -TC      Resistance 3 Red  -TC      Sets 3 3  -TC      Reps 3 15  -TC      Exercise 4    Exercise Name 4 resisted wrist flexion with eccentric wrist ext    elbow resting on table   -TC      Cueing 4 Verbal;Tactile;Demo  -TC      Equipment 4 Theraband  -TC      Resistance 4 Red  -TC      Sets 4 3  -TC      Reps 4 15  -TC      Exercise 5    Exercise Name 5 place and holds into wrist ext/ulnar dev  -TC      Cueing 5 Verbal  -TC      Time (Minutes) 5 15s x4  -TC        User Key  (r) = Recorded By, (t) = Taken By, (c) = Cosigned By    Initials Name Provider Type    TC Macey Patel, PT Physical Therapist                        Manual Rx (last 36 hours)      Manual Treatments       02/10/17 1532          Manual Rx 1     Manual Rx 1 Location L shoulder; hooklying   -TC      Manual Rx 1 Type inf GH mobs  -TC      Manual Rx 1 Grade 3  -TC      Manual Rx 1 Duration 8  -TC      Manual Rx 2    Manual Rx 2 Location Radial head  -TC      Manual Rx 2 Type rotational mobilizations  -TC      Manual Rx 2 Grade 3  -TC      Manual Rx 2 Duration 5  -TC      Manual Rx 3    Manual Rx 3 Location wrist distraction into extension   -TC      Manual Rx 3 Duration 5  -TC      Manual Rx 5    Manual Rx 5 Location elbow flexion mob  -TC      Manual Rx 5 Grade 3  -TC      Manual Rx 5 Duration 5  -TC      Manual Rx 7    Manual Rx 7 Location proximal radiocarpal joint  -TC      Manual Rx 7 Type ant glide with intermittent distraction into wrist ext PROM improved from 85 to 110 degrees passively    -TC      Manual Rx 7 Grade 3  -TC      Manual Rx 7 Duration 5  -TC        User Key  (r) = Recorded By, (t) = Taken By, (c) = Cosigned By    Initials Name Provider Type    TC Macey Patel, PT Physical Therapist                PT OP Goals       02/10/17 1532 02/03/17 1546 01/31/17 1550    PT Short Term Goals    STG Date to Achieve 02/06/17  -TC 02/06/17  -TC 02/06/17  -TC    STG 1 Patient will demonstrate understanding of precautions post surgery.   -TC Patient will demonstrate understanding of precautions post surgery.   -TC Patient will demonstrate understanding of precautions post surgery.   -TC    STG 1 Progress Partially Met  -TC Partially Met  -TC Ongoing  -TC    STG 1 Progress Comments understands, AROM at this time but no lifting >10lbs   -TC Pt understands her 10lb restrictions at this time   -TC     STG 2 Pt will demonstrate improved swelling of the L elbow region within <0.5cm of the unaffected side.   -TC Pt will demonstrate improved swelling of the L elbow region within <0.5cm of the unaffected side.   -TC Pt will demonstrate improved swelling of the L elbow region within <0.5cm of the unaffected side.   -TC    STG 2 Progress Partially Met  -TC  Ongoing  -TC Ongoing  -TC    STG 2 Progress Comments unable to test today due to pt wearing a sweater that did not allow therapist to obtain this measure  -TC      STG 3 Pt will demonstrate decreased muscle guarding and guarded posturing on the surgical side.   -TC Pt will demonstrate decreased muscle guarding and guarded posturing on the surgical side.   -TC Pt will demonstrate decreased muscle guarding and guarded posturing on the surgical side.   -TC    STG 3 Progress Partially Met  -TC Ongoing  -TC Ongoing  -TC    STG 3 Progress Comments Her ECRL and and ED, and brachioradialis and UT on the left are still guarding   -TC still guarded but much improved at biceps and brachiorad and wrist extensors  -TC improving but still limited with scar tissue mobility and fibrotic lesions in biceps and brachioradialis  -TC    STG 4 Pt will demonstrate full PROM on the affected side of the elbow.  -TC Pt will demonstrate full PROM on the affected side of the elbow.  -TC Pt will demonstrate full PROM on the affected side of the elbow.  -TC    STG 4 Progress Met  -TC Ongoing  -TC Ongoing  -TC    STG 4 Progress Comments 146 deg flexion; -4 deg ext   -TC still limited by about 7 degrees  -TC pronation 70  supination 90  -TC    Long Term Goals    LTG Date to Achieve 03/06/17  -TC 03/06/17  -TC 03/06/17  -TC    LTG 1 Pt will illustrate full PROM of the wrist.   -TC Pt will illustrate full PROM of the wrist.   -TC Pt will illustrate full PROM of the wrist.   -TC    LTG 1 Progress Progressing  -TC Ongoing  -TC Ongoing  -TC    LTG 1 Progress Comments full flexion present to 95 deg; and 65 deg PROM; flexion AROM 100 and ext 26 deg; AROM ulnar dev 5   -TC still limited with wrist ext by 25% of motion; AROM limited by 50%   -TC ulnar deviation 11, radial dev 5  -TC    LTG 2 Pt will illustrate  strength of >20lbs of the LUE with elbow by side.   -TC Pt will illustrate  strength of >20lbs of the LUE with elbow by side.   -TC Pt will  illustrate  strength of >20lbs of the LUE with elbow by side.   -TC    LTG 2 Progress Ongoing  -TC Ongoing  -TC Ongoing  -TC    LTG 2 Progress Comments LUE 45, 50.47lbs   -TC      LTG 3 Pt will illustrate pinch  of LUE of >5lbs in order to begin to improve her functional ability with ADLs and work related actvity. .  -TC Pt will illustrate pinch  of LUE of >5lbs in order to begin to improve her functional ability with ADLs and work related actvity. .  -TC Pt will illustrate pinch  of LUE of >5lbs in order to begin to improve her functional ability with ADLs and work related actvity. .  -TC    LTG 3 Progress Ongoing  -TC Ongoing  -TC Ongoing  -TC    LTG 3 Progress Comments Lt hand; 11, 12, 12 lbs   -TC      LTG 4 Pt will report ability to perform typing activity without increased pain and signs of fatigue.   -TC Pt will report ability to perform typing activity without increased pain and signs of fatigue.   -TC Pt will report ability to perform typing activity without increased pain and signs of fatigue.   -TC    LTG 4 Progress Met  -TC Met  -TC Ongoing  -TC    LTG 4 Progress Comments  She reports no pain with typing activity  -TC she reports she has had no issues with work related activities such as typing but will feel sore afterwards   -TC    LTG 5 Pt will report no pain with lifting <10lb objects at home and work or opening objects.   -TC Pt will report no pain with lifting <10lb objects at home and work or opening objects.   -TC     LTG 5 Progress Ongoing  -TC New  -TC     LTG 5 Progress Comments she is not having pain but she is not quite ready to lift 10lbs at this time   -TC      LTG 6 Pt will demonstrate MMT of wrist extensors to 4/5 before discharge in order to return to her PLOF.   -TC Pt will demonstrate MMT of wrist extensors to 4/5 before discharge in order to return to her PLOF.   -TC     LTG 6 Progress Ongoing  -TC New  -TC     LTG 6 Progress Comments she still does not have full range  in her wrist 3-/5  -TC      Time Calculation    PT Goal Re-Cert Due Date 03/10/17  -TC 02/09/17  -TC 02/09/17  -TC      01/30/17 1600          PT Short Term Goals    STG Date to Achieve 02/06/17  -HR      STG 1 Patient will demonstrate understanding of precautions post surgery.   -HR      STG 1 Progress Ongoing  -HR      STG 2 Pt will demonstrate improved swelling of the L elbow region within <0.5cm of the unaffected side.   -HR      STG 2 Progress Ongoing  -HR      STG 3 Pt will demonstrate decreased muscle guarding and guarded posturing on the surgical side.   -HR      STG 3 Progress Ongoing  -HR      STG 4 Pt will demonstrate full PROM on the affected side of the elbow.  -HR      STG 4 Progress Ongoing  -HR      STG 4 Progress Comments +2 hyperextension and 152 flexion PROM  -HR      Long Term Goals    LTG Date to Achieve 03/06/17  -HR      LTG 1 Pt will illustrate full PROM of the wrist.   -HR      LTG 1 Progress Ongoing  -HR      LTG 2 Pt will illustrate  strength of >20lbs of the LUE with elbow by side.   -HR      LTG 2 Progress Ongoing  -HR      LTG 3 Pt will illustrate pinch  of LUE of >5lbs in order to begin to improve her functional ability with ADLs and work related actvity. .  -HR      LTG 3 Progress Ongoing  -HR      LTG 4 Pt will report ability to perform typing activity without increased pain and signs of fatigue.   -HR      LTG 4 Progress Ongoing  -HR        User Key  (r) = Recorded By, (t) = Taken By, (c) = Cosigned By    Initials Name Provider Type    HR Elza Paredes, PT Physical Therapist    TC Macey Patel, PT Physical Therapist                Therapy Education       02/10/17 1630    Therapy Education    Given HEP;Posture/body mechanics   educated on DOMS and to rest for a day before resuming ex after today   -TC    Program New  -TC    How Provided Verbal;Demonstration  -TC    Provided to Patient  -TC    Level of Understanding Verbalized;Demonstrated  -TC      02/03/17 1700     Therapy Education    Given HEP;Posture/body mechanics   educated on postural awareness to avoid LUE scapular and shoulder impairments along with elbow and wrist.   -TC    Program New   Active wrist extension added to HEP and weight shifting onto L in plantargrade   -TC    How Provided Verbal;Demonstration  -TC    Provided to Patient  -TC    Level of Understanding Verbalized;Demonstrated  -TC      User Key  (r) = Recorded By, (t) = Taken By, (c) = Cosigned By    Initials Name Provider Type    TC Macey Patel, PT Physical Therapist                Time Calculation:   Start Time: 1532  Stop Time: 1621  Time Calculation (min): 49 min  Total Timed Code Minutes- PT: 49 minute(s)    Therapy Charges for Today     Code Description Service Date Service Provider Modifiers Qty    24894961040 HC PT MANUAL THERAPY EA 15 MIN 2/10/2017 Macey Patel, PT GP 2    62102595878 HC PT THER PROC EA 15 MIN 2/10/2017 Macey Patel, PT GP 1                    Macey Patel PT  2/10/2017

## 2017-02-14 ENCOUNTER — HOSPITAL ENCOUNTER (OUTPATIENT)
Dept: PHYSICAL THERAPY | Facility: HOSPITAL | Age: 47
Setting detail: THERAPIES SERIES
Discharge: HOME | End: 2017-02-14

## 2017-02-14 DIAGNOSIS — Z48.89 AFTERCARE FOLLOWING SURGERY: Primary | ICD-10-CM

## 2017-02-14 DIAGNOSIS — S66.812A HAND AND WRIST EXTENSOR TENDON RUPTURE, LEFT, INITIAL ENCOUNTER: ICD-10-CM

## 2017-02-14 PROCEDURE — 97110 THERAPEUTIC EXERCISES: CPT

## 2017-02-14 PROCEDURE — 97140 MANUAL THERAPY 1/> REGIONS: CPT

## 2017-02-14 NOTE — PROGRESS NOTES
Outpatient Physical Therapy Ortho Treatment Note   [place]     Patient Name: Maribell Whitley  : 1970  MRN: [id number]  Today's Date: 2017      Visit Date: 2017    Visit Dx:    ICD-10-CM ICD-9-CM   1. Aftercare following surgery Z48.89 V58.89   2. Hand and wrist extensor tendon rupture, left, initial encounter S66.812A 842.10       There is no problem list on file for this patient.       No past medical history on file.     Past Surgical History   Procedure Laterality Date   • Hysterectomy     • Gastric bypass     • Cholecystectomy                               PT Assessment/Plan       17 1600 02/10/17 1552       PT Assessment    Functional Limitations  Limitation in home management;Limitations in community activities;Performance in leisure activities;Performance in self-care ADL  -TC     Impairments  Impaired flexibility;Impaired muscle endurance;Impaired muscle length;Impaired neuromotor development;Impaired muscle power;Muscle strength;Pain;Range of motion  -TC     Assessment Comments The radiocapitellar joint and biceps length are the most prevalent soft tissue restrictions at this time.  The patient was progressed today on the HEP for wrist AROM per rehab guidelines with no weight added.  Patient is doing well.  -RS Pt has met 2/10 goals at this time. She has met her elbow ROM goals and almost met her wrist mobility and ROM goals. Her wrist extension and ulnar deviation are most limited both passively and actively although significantly improved. I was able to assess her  and pinch  on the L today and therefore we have a baseline measure at this time. Her edema is almost dissipated and I am quite pleased with her scar mobility as well. We are carefully progressing her with L wrist AROM and strengthening activites both concentrically and eccentrically but still focusing on passive mobility as well and static/dynamic stabilization as well following protocol and 10lb  weight limit. She has progressed quite well and I am very pleased with her gains.    -TC     Please refer to paper survey for additional self-reported information  Yes  -TC     Rehab Potential  Good  -TC     Patient/caregiver participated in establishment of treatment plan and goals  Yes  -TC     Patient would benefit from skilled therapy intervention  Yes  -TC     PT Plan    PT Frequency  2x/week  -TC     Predicted Duration of Therapy Intervention (days/wks)  6 weeks   -TC     Planned CPT's?  PT THER PROC EA 15 MIN: 41141;PT THER ACT EA 15 MIN: 16490;PT MANUAL THERAPY EA 15 MIN: 59235;PT NEUROMUSC RE-EDUCATION EA 15 MIN: 44687;PT ELECTRICAL STIM ATTD EA 15 MIN: 25357;PT ELECTRICAL STIM UNATTEND:   -TC     Physical Therapy Interventions (Optional Details)  transfer training;taping;gross motor skills;neuromuscular re-education;motor coordination training;modalities;manual therapy techniques;joint mobilization;home exercise program;stretching;strengthening  -TC     PT Plan Comments assess the effects of the new HEP; continue with biceps length and radiocapitellar joint mobility; progress per protocol.  -RS During today's session I did slightly progress her so we will assess the effects of progression next session. We will reassess her shoulder once again and continue with wrist mobility, wrist/elbow ROM and progress towards stability and strengthening exercises according to protocol and healing phases.   -TC       User Key  (r) = Recorded By, (t) = Taken By, (c) = Cosigned By    Initials Name Provider Type    RS Jonny Holman, PT Physical Therapist    TC Macey Patel, PT Physical Therapist                    Exercises       02/14/17 1500          Subjective Comments    Subjective Comments Pt is feeling   -RS      Subjective Pain    Able to rate subjective pain? yes  -RS      Pre-Treatment Pain Level 0  -RS      Post-Treatment Pain Level 0  -RS      Exercise 1    Exercise Name 1 demonstrating of new  HEP:  educated and performed wrist AROM, eccentric AROM, ulnar/radial deviation, tendon gliding.   pt demonstrated understanding  -RS      Time (Minutes) 1 10  -RS        User Key  (r) = Recorded By, (t) = Taken By, (c) = Cosigned By    Initials Name Provider Type    RS Jonny Holman, PT Physical Therapist                        Manual Rx (last 36 hours)      Manual Treatments       02/14/17 1500          Manual Rx 1    Manual Rx 1 Location supine left ulnotrochlear  -RS      Manual Rx 1 Type distraction  -RS      Manual Rx 1 Grade 2  -RS      Manual Rx 1 Duration 4  -RS      Manual Rx 2    Manual Rx 2 Location radiocapitellar joint   -RS      Manual Rx 2 Type pronation mobilization  -RS      Manual Rx 2 Grade 2/3 oscillatory  -RS      Manual Rx 2 Duration 5  -RS      Manual Rx 3    Manual Rx 3 Location elbow extension stretch biceps length test position  -RS      Manual Rx 3 Type sustained mod OP with shoulder stabilized  -RS      Manual Rx 3 Grade 2  -RS      Manual Rx 3 Duration 6  -RS      Manual Rx 4    Manual Rx 4 Location (L) AP radiocapitellar mobilizations  -RS      Manual Rx 4 Type oscillatory  -RS      Manual Rx 4 Grade 2/3  -RS      Manual Rx 4 Duration 5  -RS      Manual Rx 5    Manual Rx 5 Location MWM left elbow flexion with distraction applied  -RS      Manual Rx 5 Type sustained  -RS      Manual Rx 5 Grade 1  -RS      Manual Rx 5 Duration 7  -RS        User Key  (r) = Recorded By, (t) = Taken By, (c) = Cosigned By    Initials Name Provider Type    RS Jonny Holman, PT Physical Therapist                PT OP Goals       02/14/17 1500 02/10/17 1532 02/03/17 1546    PT Short Term Goals    STG Date to Achieve 02/06/17  -RS 02/06/17  -TC 02/06/17  -TC    STG 1 Patient will demonstrate understanding of precautions post surgery.   -RS Patient will demonstrate understanding of precautions post surgery.   -TC Patient will demonstrate understanding of precautions post surgery.   -TC    STG 1  Progress Partially Met  -RS Partially Met  -TC Partially Met  -TC    STG 1 Progress Comments  understands, AROM at this time but no lifting >10lbs   -TC Pt understands her 10lb restrictions at this time   -TC    STG 2 Pt will demonstrate improved swelling of the L elbow region within <0.5cm of the unaffected side.   -RS Pt will demonstrate improved swelling of the L elbow region within <0.5cm of the unaffected side.   -TC Pt will demonstrate improved swelling of the L elbow region within <0.5cm of the unaffected side.   -TC    STG 2 Progress Partially Met  -RS Partially Met  -TC Ongoing  -TC    STG 2 Progress Comments  unable to test today due to pt wearing a sweater that did not allow therapist to obtain this measure  -TC     STG 3 Pt will demonstrate decreased muscle guarding and guarded posturing on the surgical side.   -RS Pt will demonstrate decreased muscle guarding and guarded posturing on the surgical side.   -TC Pt will demonstrate decreased muscle guarding and guarded posturing on the surgical side.   -TC    STG 3 Progress Partially Met  -RS Partially Met  -TC Ongoing  -TC    STG 3 Progress Comments  Her ECRL and and ED, and brachioradialis and UT on the left are still guarding   -TC still guarded but much improved at biceps and brachiorad and wrist extensors  -TC    STG 4 Pt will demonstrate full PROM on the affected side of the elbow.  -RS Pt will demonstrate full PROM on the affected side of the elbow.  -TC Pt will demonstrate full PROM on the affected side of the elbow.  -TC    STG 4 Progress Met  -RS Met  -TC Ongoing  -TC    STG 4 Progress Comments  146 deg flexion; -4 deg ext   -TC still limited by about 7 degrees  -TC    Long Term Goals    LTG Date to Achieve 03/06/17  -RS 03/06/17  -TC 03/06/17  -TC    LTG 1 Pt will illustrate full PROM of the wrist.   -RS Pt will illustrate full PROM of the wrist.   -TC Pt will illustrate full PROM of the wrist.   -TC    LTG 1 Progress Progressing  -RS Progressing   -TC Ongoing  -TC    LTG 1 Progress Comments  full flexion present to 95 deg; and 65 deg PROM; flexion AROM 100 and ext 26 deg; AROM ulnar dev 5   -TC still limited with wrist ext by 25% of motion; AROM limited by 50%   -TC    LTG 2 Pt will illustrate  strength of >20lbs of the LUE with elbow by side.   -RS Pt will illustrate  strength of >20lbs of the LUE with elbow by side.   -TC Pt will illustrate  strength of >20lbs of the LUE with elbow by side.   -TC    LTG 2 Progress Ongoing  -RS Ongoing  -TC Ongoing  -TC    LTG 2 Progress Comments  LUE 45, 50.47lbs   -TC     LTG 3 Pt will illustrate pinch  of LUE of >5lbs in order to begin to improve her functional ability with ADLs and work related actvity. .  -RS Pt will illustrate pinch  of LUE of >5lbs in order to begin to improve her functional ability with ADLs and work related actvity. .  -TC Pt will illustrate pinch  of LUE of >5lbs in order to begin to improve her functional ability with ADLs and work related actvity. .  -TC    LTG 3 Progress Ongoing  -RS Ongoing  -TC Ongoing  -TC    LTG 3 Progress Comments  Lt hand; 11, 12, 12 lbs   -TC     LTG 4 Pt will report ability to perform typing activity without increased pain and signs of fatigue.   -RS Pt will report ability to perform typing activity without increased pain and signs of fatigue.   -TC Pt will report ability to perform typing activity without increased pain and signs of fatigue.   -TC    LTG 4 Progress Met  -RS Met  -TC Met  -TC    LTG 4 Progress Comments   She reports no pain with typing activity  -TC    LTG 5 Pt will report no pain with lifting <10lb objects at home and work or opening objects.   -RS Pt will report no pain with lifting <10lb objects at home and work or opening objects.   -TC Pt will report no pain with lifting <10lb objects at home and work or opening objects.   -TC    LTG 5 Progress Ongoing  -RS Ongoing  -TC New  -TC    LTG 5 Progress Comments  she is not having  pain but she is not quite ready to lift 10lbs at this time   -TC     LTG 6 Pt will demonstrate MMT of wrist extensors to 4/5 before discharge in order to return to her PLOF.   -RS Pt will demonstrate MMT of wrist extensors to 4/5 before discharge in order to return to her PLOF.   -TC Pt will demonstrate MMT of wrist extensors to 4/5 before discharge in order to return to her PLOF.   -TC    LTG 6 Progress Progressing  -RS Ongoing  -TC New  -TC    LTG 6 Progress Comments per rehab guidelines  -RS she still does not have full range in her wrist 3-/5  -TC     Time Calculation    PT Goal Re-Cert Due Date 03/10/17  -RS 03/10/17  -TC 02/09/17  -TC      User Key  (r) = Recorded By, (t) = Taken By, (c) = Cosigned By    Initials Name Provider Type    JASEN Holman, PT Physical Therapist    TC Macey Patel, PT Physical Therapist                Therapy Education       02/14/17 1600    Therapy Education    Given HEP  -RS    Program New   wrist AROM, AROM radial/ulnar deviation, tendon gliding.  -RS    How Provided Verbal;Demonstration;Written  -RS    Provided to Patient  -RS    Level of Understanding Verbalized;Demonstrated;Teach back education performed  -RS      02/10/17 1630    Therapy Education    Given HEP;Posture/body mechanics   educated on DOMS and to rest for a day before resuming ex after today   -TC    Program New  -TC    How Provided Verbal;Demonstration  -TC    Provided to Patient  -TC    Level of Understanding Verbalized;Demonstrated  -TC      User Key  (r) = Recorded By, (t) = Taken By, (c) = Cosigned By    Initials Name Provider Type    JASEN Holman, PT Physical Therapist    TC Macey Patel, PT Physical Therapist                Time Calculation:   Start Time: 1548  Stop Time: 1634  Time Calculation (min): 46 min  Total Timed Code Minutes- PT: 40 minute(s)    Therapy Charges for Today     Code Description Service Date Service Provider Modifiers Qty    60799450814  PT THER PROC  EA 15 MIN 2/14/2017 Jonny Holman, PT GP 1    [id number]  PT MANUAL THERAPY EA 15 MIN 2/14/2017 Jonny Holman, PT GP 2                    Jonny Holman, PT  2/14/2017

## 2017-02-17 ENCOUNTER — APPOINTMENT (OUTPATIENT)
Dept: PHYSICAL THERAPY | Facility: HOSPITAL | Age: 47
End: 2017-02-17

## 2017-02-21 ENCOUNTER — HOSPITAL ENCOUNTER (OUTPATIENT)
Dept: PHYSICAL THERAPY | Facility: HOSPITAL | Age: 47
Setting detail: THERAPIES SERIES
Discharge: HOME | End: 2017-02-21

## 2017-02-21 DIAGNOSIS — Z48.89 AFTERCARE FOLLOWING SURGERY: Primary | ICD-10-CM

## 2017-02-21 DIAGNOSIS — S66.812A HAND AND WRIST EXTENSOR TENDON RUPTURE, LEFT, INITIAL ENCOUNTER: ICD-10-CM

## 2017-02-21 PROCEDURE — 97110 THERAPEUTIC EXERCISES: CPT

## 2017-02-21 PROCEDURE — 97140 MANUAL THERAPY 1/> REGIONS: CPT

## 2017-02-21 NOTE — PROGRESS NOTES
Outpatient Physical Therapy Ortho Treatment Note  [facility]     Patient Name: Maribell Whitley  : 1970  MRN: [id number]  Today's Date: 2017      Visit Date: 2017    Visit Dx:    ICD-10-CM ICD-9-CM   1. Aftercare following surgery Z48.89 V58.89   2. Hand and wrist extensor tendon rupture, left, initial encounter S66.812A 842.10       There is no problem list on file for this patient.       No past medical history on file.     Past Surgical History   Procedure Laterality Date   • Hysterectomy     • Gastric bypass     • Cholecystectomy                               PT Assessment/Plan       17 1600          PT Assessment    Assessment Comments The patient did not report any pain with the standing proprioception exercises today.  The left UE did fatigue quickly today with the standing exercises.  We will adhere to the weight restrictions and progress concentri/eccentric and global shoulder strengthening going forward.  AROM of the left elbow today was from 0-150 degrees with no pain.  -RS      PT Plan    PT Plan Comments continue to progress proprioception and functional strengthening within the lifting restrictions of 10#  -RS        User Key  (r) = Recorded By, (t) = Taken By, (c) = Cosigned By    Initials Name Provider Type    RS Jonny Holman, PT Physical Therapist                    Exercises       17 1600          Subjective Comments    Subjective Comments Pt has no new issues at this time.  No sharp pain in the elbow at this time.  HEP is going well.  -RS      Subjective Pain    Able to rate subjective pain? yes  -RS      Pre-Treatment Pain Level 0  -RS      Post-Treatment Pain Level 0  -RS      Exercise 1    Exercise Name 1 CW/CCW circles at 90 deg flexion  -RS      Cueing 1 Demo  -RS      Sets 1 3  -RS      Reps 1 20  -RS      Exercise 2    Exercise Name 2 120 deg flexion:  (L) UE wall taps using small green ball  -RS      Cueing 2 Demo  -RS      Sets 2 4  -RS       Time (Minutes) 2 1  -RS      Exercise 3    Exercise Name 3 standing ER isometric with green therabar for perturbation   towel roll at elbow  -RS      Cueing 3 Demo  -RS      Equipment 3 Theraband  -RS      Resistance 3 Yellow  -RS      Sets 3 4  -RS      Time (Seconds) 3 20-30  -RS      Exercise 4    Exercise Name 4 went over prior HEP:  demonstrated triceps stretch  -RS      Exercise 5    Exercise Name 5 left bicep eccentric lowering:  towel roll under the elbow   therapist provided careful guarding  -RS      Equipment 5 Dumbell  -RS      Weights/Plates 5 3  -RS      Sets 5 3  -RS      Reps 5 12  -RS        User Key  (r) = Recorded By, (t) = Taken By, (c) = Cosigned By    Initials Name Provider Type    RS Jonny Holman, PT Physical Therapist                        Manual Rx (last 36 hours)      Manual Treatments       02/21/17 1500          Manual Rx 1    Manual Rx 1 Location supine left ulnotrochlear  -RS      Manual Rx 1 Type distraction  -RS      Manual Rx 1 Grade 2  -RS      Manual Rx 1 Duration 4  -RS      Manual Rx 2    Manual Rx 2 Location left elbow extension stretch  -RS      Manual Rx 2 Type biceps length test with towel roll  -RS      Manual Rx 2 Grade sustained repetition  -RS      Manual Rx 2 Duration 5 x 30 sec  -RS      Manual Rx 4    Manual Rx 4 Location (L) AP radiocapitellar mobilizations  -RS      Manual Rx 4 Type oscillatory  -RS      Manual Rx 4 Grade 2/3  -RS      Manual Rx 4 Duration 5  -RS        User Key  (r) = Recorded By, (t) = Taken By, (c) = Cosigned By    Initials Name Provider Type    RS Jonny Holman, PT Physical Therapist                PT OP Goals       02/21/17 1500 02/14/17 1500 02/10/17 1532    PT Short Term Goals    STG Date to Achieve 02/06/17  -RS 02/06/17  -RS 02/06/17  -TC    STG 1 Patient will demonstrate understanding of precautions post surgery.   -RS Patient will demonstrate understanding of precautions post surgery.   -RS Patient will demonstrate  understanding of precautions post surgery.   -TC    STG 1 Progress Partially Met  -RS Partially Met  -RS Partially Met  -TC    STG 1 Progress Comments   understands, AROM at this time but no lifting >10lbs   -TC    STG 2 Pt will demonstrate improved swelling of the L elbow region within <0.5cm of the unaffected side.   -RS Pt will demonstrate improved swelling of the L elbow region within <0.5cm of the unaffected side.   -RS Pt will demonstrate improved swelling of the L elbow region within <0.5cm of the unaffected side.   -TC    STG 2 Progress Partially Met  -RS Partially Met  -RS Partially Met  -TC    STG 2 Progress Comments   unable to test today due to pt wearing a sweater that did not allow therapist to obtain this measure  -TC    STG 3 Pt will demonstrate decreased muscle guarding and guarded posturing on the surgical side.   -RS Pt will demonstrate decreased muscle guarding and guarded posturing on the surgical side.   -RS Pt will demonstrate decreased muscle guarding and guarded posturing on the surgical side.   -TC    STG 3 Progress Partially Met  -RS Partially Met  -RS Partially Met  -TC    STG 3 Progress Comments   Her ECRL and and ED, and brachioradialis and UT on the left are still guarding   -TC    STG 4 Pt will demonstrate full PROM on the affected side of the elbow.  -RS Pt will demonstrate full PROM on the affected side of the elbow.  -RS Pt will demonstrate full PROM on the affected side of the elbow.  -TC    STG 4 Progress Met  -RS Met  -RS Met  -TC    STG 4 Progress Comments   146 deg flexion; -4 deg ext   -TC    Long Term Goals    LTG Date to Achieve 03/06/17  -RS 03/06/17  -RS 03/06/17  -TC    LTG 1 Pt will illustrate full PROM of the wrist.   -RS Pt will illustrate full PROM of the wrist.   -RS Pt will illustrate full PROM of the wrist.   -TC    LTG 1 Progress Progressing  -RS Progressing  -RS Progressing  -TC    LTG 1 Progress Comments   full flexion present to 95 deg; and 65 deg PROM;  flexion AROM 100 and ext 26 deg; AROM ulnar dev 5   -TC    LTG 2 Pt will illustrate  strength of >20lbs of the LUE with elbow by side.   -RS Pt will illustrate  strength of >20lbs of the LUE with elbow by side.   -RS Pt will illustrate  strength of >20lbs of the LUE with elbow by side.   -TC    LTG 2 Progress Ongoing  -RS Ongoing  -RS Ongoing  -TC    LTG 2 Progress Comments   LUE 45, 50.47lbs   -TC    LTG 3 Pt will illustrate pinch  of LUE of >5lbs in order to begin to improve her functional ability with ADLs and work related actvity. .  -RS Pt will illustrate pinch  of LUE of >5lbs in order to begin to improve her functional ability with ADLs and work related actvity. .  -RS Pt will illustrate pinch  of LUE of >5lbs in order to begin to improve her functional ability with ADLs and work related actvity. .  -TC    LTG 3 Progress Ongoing  -RS Ongoing  -RS Ongoing  -TC    LTG 3 Progress Comments   Lt hand; 11, 12, 12 lbs   -TC    LTG 4 Pt will report ability to perform typing activity without increased pain and signs of fatigue.   -RS Pt will report ability to perform typing activity without increased pain and signs of fatigue.   -RS Pt will report ability to perform typing activity without increased pain and signs of fatigue.   -TC    LTG 4 Progress Met  -RS Met  -RS Met  -TC    LTG 5 Pt will report no pain with lifting <10lb objects at home and work or opening objects.   -RS Pt will report no pain with lifting <10lb objects at home and work or opening objects.   -RS Pt will report no pain with lifting <10lb objects at home and work or opening objects.   -TC    LTG 5 Progress Partially Met  -RS Ongoing  -RS Ongoing  -TC    LTG 5 Progress Comments   she is not having pain but she is not quite ready to lift 10lbs at this time   -TC    LTG 6 Pt will demonstrate MMT of wrist extensors to 4/5 before discharge in order to return to her PLOF.   -RS Pt will demonstrate MMT of wrist extensors to 4/5  before discharge in order to return to her PLOF.   -RS Pt will demonstrate MMT of wrist extensors to 4/5 before discharge in order to return to her PLOF.   -TC    LTG 6 Progress Progressing  -RS Progressing  -RS Ongoing  -TC    LTG 6 Progress Comments addressing with exercises in the clinic.  -RS per rehab guidelines  -RS she still does not have full range in her wrist 3-/5  -TC    Time Calculation    PT Goal Re-Cert Due Date 03/10/17  -RS 03/10/17  -RS 03/10/17  -TC      User Key  (r) = Recorded By, (t) = Taken By, (c) = Cosigned By    Initials Name Provider Type    RS Jonny Holman, PT Physical Therapist    TC Macey Patel, PT Physical Therapist                Therapy Education       02/21/17 1600    Therapy Education    Given HEP  -RS    Program Reinforced  -RS    How Provided Verbal  -RS    Provided to Patient  -RS    Level of Understanding Verbalized  -RS      User Key  (r) = Recorded By, (t) = Taken By, (c) = Cosigned By    Initials Name Provider Type    RS Jonny Holman, PT Physical Therapist                Time Calculation:   Start Time: 1547  Stop Time: 1632  Time Calculation (min): 45 min  PT Non-Billable Time (min): 4 min  Total Timed Code Minutes- PT: 41 minute(s)    Therapy Charges for Today     Code Description Service Date Service Provider Modifiers Qty    96687965289 HC PT THER PROC EA 15 MIN 2/21/2017 Jonny Holman, PT GP 2    [id number] HC PT MANUAL THERAPY EA 15 MIN 2/21/2017 Jonny Holman, PT GP 1                    Jonny Holman, PT  2/21/2017

## 2017-02-24 ENCOUNTER — HOSPITAL ENCOUNTER (OUTPATIENT)
Dept: PHYSICAL THERAPY | Facility: HOSPITAL | Age: 47
Setting detail: THERAPIES SERIES
Discharge: HOME | End: 2017-02-24

## 2017-02-24 DIAGNOSIS — Z48.89 AFTERCARE FOLLOWING SURGERY: Primary | ICD-10-CM

## 2017-02-24 DIAGNOSIS — S66.812A HAND AND WRIST EXTENSOR TENDON RUPTURE, LEFT, INITIAL ENCOUNTER: ICD-10-CM

## 2017-02-24 PROCEDURE — 97140 MANUAL THERAPY 1/> REGIONS: CPT

## 2017-02-24 PROCEDURE — 97110 THERAPEUTIC EXERCISES: CPT

## 2017-02-24 NOTE — PROGRESS NOTES
Outpatient Physical Therapy Ortho Treatment Note  [facility]     Patient Name: Maribell Whitley  : 1970  MRN: [id number]  Today's Date: 2017      Visit Date: 2017    Visit Dx:    ICD-10-CM ICD-9-CM   1. Aftercare following surgery Z48.89 V58.89   2. Hand and wrist extensor tendon rupture, left, initial encounter S66.812A 842.10       There is no problem list on file for this patient.       No past medical history on file.     Past Surgical History   Procedure Laterality Date   • Hysterectomy     • Gastric bypass     • Cholecystectomy  2016            Hand Therapy (last 24 hours)      Hand Eval       17 1600           Strength Right    Right  Test 1 42  -EC      Right  Test 2 60  -EC      Right  Test 3 50  -EC       Strength Average Right 50.67  -EC       Strength Left    Left  Test 1 45  -EC      Left  Test 2 45  -EC      Left  Test 3 40  -EC       Strength Average Left 43.33  -EC        User Key  (r) = Recorded By, (t) = Taken By, (c) = Cosigned By    Initials Name Provider Type    ANI Brand PTA Physical Therapy Assistant                          PT Assessment/Plan       17 1702       PT Assessment    Assessment Comments Pt's overall ROM and tolerance for stretching has draamatically improved as compared to the last treatment session in which I saw her. her L finger flexion, wrist flexion with elbow flexion improved post IASTM. Post session she did report elbow pain but I cntribute this to general muscle weakness  of the L UE  -EC     PT Plan    PT Plan Comments Continue to progress L UE strengthening and endurance as symptoms allow.  -EC       User Key  (r) = Recorded By, (t) = Taken By, (c) = Cosigned By    Initials Name Provider Type    ANI Brand PTA Physical Therapy Assistant                    Exercises       17 1500          Subjective Comments    Subjective Comments Pt reports there aren't any acctivities she  is having difficulty with her L UE  -EC      Subjective Pain    Able to rate subjective pain? yes  -EC      Pre-Treatment Pain Level 0  -EC      Post-Treatment Pain Level 4  -EC      Exercise 1    Exercise Name 1 L serratus punch   -EC      Equipment 1 Dumbell  -EC      Weights/Plates 1 2  -EC      Reps 1 20  -EC      Exercise 2    Exercise Name 2 wrist flexion/extension curls seated  -EC      Equipment 2 Dumbell  -EC      Weights/Plates 2 2  -EC      Reps 2 20  -EC      Exercise 3    Exercise Name 3 assessed B  test (ssee hand section)  -EC      Exercise 4    Exercise Name 4 isometric shoulder flexion with manual perturbations 40, 90 120 degrees  -EC      Weights/Plates 4 1  -EC      Time (Seconds) 4 30  -EC      Exercise 5    Exercise Name 5 L bicep curls in standing with 1/2 blue foam roller against wall  -EC      Equipment 5 Dumbell  -EC      Weights/Plates 5 2  -EC      Reps 5 20  -EC      Exercise 6    Exercise Name 6 isometric R shoulder ER?IR neutral with manual perturbations  -EC      Equipment 6 Dumbell  -EC      Weights/Plates 6 1  -EC      Time (Seconds) 6 30  -EC      Exercise 7    Exercise Name 7 L wrist pronation/supination   -EC      Equipment 7 --   Therabar Red  -EC      Time (Minutes) 7 2  -EC        User Key  (r) = Recorded By, (t) = Taken By, (c) = Cosigned By    Initials Name Provider Type    EC Francisco Brand PTA Physical Therapy Assistant                        Manual Rx (last 36 hours)      Manual Treatments       02/24/17 1500          Manual Rx 1    Manual Rx 1 Location L wrist extensor group  -EC      Manual Rx 1 Type IASTM with EDGE tool  -EC      Manual Rx 1 Duration 5  -EC      Manual Rx 2    Manual Rx 2 Location L elbow/wrist extension stretches with intermittent radiocarpal glides  -EC      Manual Rx 2 Grade 2  -EC      Manual Rx 2 Duration 5  -EC        User Key  (r) = Recorded By, (t) = Taken By, (c) = Cosigned By    Initials Name Provider Type    EC Francisco Brand PTA  Physical Therapy Assistant                PT OP Goals       02/24/17 1619 02/24/17 1600    PT Short Term Goals    STG Date to Achieve  02/06/17  -EC    STG 1  Patient will demonstrate understanding of precautions post surgery.   -EC    STG 1 Progress  Partially Met  -EC    STG 2  Pt will demonstrate improved swelling of the L elbow region within <0.5cm of the unaffected side.   -EC    STG 2 Progress  Partially Met  -EC    STG 3  Pt will demonstrate decreased muscle guarding and guarded posturing on the surgical side.   -EC    STG 3 Progress  Partially Met  -EC    STG 4  Pt will demonstrate full PROM on the affected side of the elbow.  -EC    STG 4 Progress  Met  -EC    Long Term Goals    LTG Date to Achieve  03/06/17  -EC    LTG 1  Pt will illustrate full PROM of the wrist.   -EC    LTG 1 Progress  Progressing  -EC    LTG 2  Pt will illustrate  strength of >20lbs of the LUE with elbow by side.   -EC    LTG 2 Progress  Ongoing  -EC    LTG 3  Pt will illustrate pinch  of LUE of >5lbs in order to begin to improve her functional ability with ADLs and work related actvity. .  -EC    LTG 3 Progress  Ongoing  -EC    LTG 4  Pt will report ability to perform typing activity without increased pain and signs of fatigue.   -EC    LTG 4 Progress  Met  -EC    LTG 5  Pt will report no pain with lifting <10lb objects at home and work or opening objects.   -EC    LTG 5 Progress  Partially Met  -EC    LTG 6  Pt will demonstrate MMT of wrist extensors to 4/5 before discharge in order to return to her PLOF.   -EC    LTG 6 Progress  Progressing  -EC    Time Calculation    PT Goal Re-Cert Due Date 03/10/17  -EC       User Key  (r) = Recorded By, (t) = Taken By, (c) = Cosigned By    Initials Name Provider Type    ANI Brand PTA Physical Therapy Assistant                Therapy Education       02/24/17 1702          Therapy Education    Given Symptoms/condition management  -EC      How Provided Verbal  -EC      Provided  to Patient  -EC      Level of Understanding Verbalized  -EC        User Key  (r) = Recorded By, (t) = Taken By, (c) = Cosigned By    Initials Name Provider Type    EC Francisco Brand PTA Physical Therapy Assistant                Time Calculation:   Start Time: 0348  Stop Time: 0427  Time Calculation (min): 39 min  Total Timed Code Minutes- PT: 39 minute(s)    Therapy Charges for Today     Code Description Service Date Service Provider Modifiers Qty    96998056750 HC PT MANUAL THERAPY EA 15 MIN 2/24/2017 Francisco Brand PTA GP 1    00649976547 HC PT THER PROC EA 15 MIN 2/24/2017 Francisco Brand PTA GP 2                    Francisco Brand PTA  2/24/2017

## 2017-02-28 ENCOUNTER — HOSPITAL ENCOUNTER (OUTPATIENT)
Dept: PHYSICAL THERAPY | Facility: HOSPITAL | Age: 47
Setting detail: THERAPIES SERIES
Discharge: HOME | End: 2017-02-28

## 2017-02-28 DIAGNOSIS — Z48.89 AFTERCARE FOLLOWING SURGERY: Primary | ICD-10-CM

## 2017-02-28 DIAGNOSIS — S66.812A HAND AND WRIST EXTENSOR TENDON RUPTURE, LEFT, INITIAL ENCOUNTER: ICD-10-CM

## 2017-02-28 PROCEDURE — 97140 MANUAL THERAPY 1/> REGIONS: CPT

## 2017-02-28 PROCEDURE — 97110 THERAPEUTIC EXERCISES: CPT

## 2017-03-03 ENCOUNTER — HOSPITAL ENCOUNTER (OUTPATIENT)
Dept: PHYSICAL THERAPY | Facility: HOSPITAL | Age: 47
Setting detail: THERAPIES SERIES
Discharge: HOME | End: 2017-03-03

## 2017-03-03 DIAGNOSIS — S66.812A HAND AND WRIST EXTENSOR TENDON RUPTURE, LEFT, INITIAL ENCOUNTER: ICD-10-CM

## 2017-03-03 DIAGNOSIS — Z48.89 AFTERCARE FOLLOWING SURGERY: Primary | ICD-10-CM

## 2017-03-03 PROCEDURE — 97140 MANUAL THERAPY 1/> REGIONS: CPT

## 2017-03-03 PROCEDURE — 97110 THERAPEUTIC EXERCISES: CPT

## 2017-03-06 NOTE — PROGRESS NOTES
Outpatient Physical Therapy Ortho Treatment Note  [facility]     Patient Name: Maribell Whitley  : 1970  MRN: [id number]  Today's Date: 3/6/2017      Visit Date: 2017    Visit Dx:    ICD-10-CM ICD-9-CM   1. Aftercare following surgery Z48.89 V58.89   2. Hand and wrist extensor tendon rupture, left, initial encounter S66.812A 842.10       There is no problem list on file for this patient.       No past medical history on file.     Past Surgical History   Procedure Laterality Date   • Hysterectomy     • Gastric bypass     • Cholecystectomy  2016           17 1549   PT Assessment   Assessment Comments Pt still presents with scapular guarding, impaired biceps and pronator teres extensibillity, and weak scapular musculature limiting her ROM and decreasing her pathomechanics with LUE movement. She demonstrated full wrist PROM but lacks the extenor tendon extensibility for full wrist and finger flexion.    PT Plan   PT Plan Comments Continue to address her ST restrictions and L UE stability and proprioception activites. Light eccentric strengthening with progression towards concentric strengthening as indicated.         17 9166   Subjective Comments   Subjective Comments Pt reported that she has not had any pain since last session. Reports non compliance this week with HEP due to stress/work related.    Subjective Pain   Able to rate subjective pain? yes   Pre-Treatment Pain Level 0   Post-Treatment Pain Level 0   Exercise 1   Exercise Name 1 prone Is    Cueing 1 Demo   Sets 1 2   Reps 1 15   Exercise 2   Exercise Name 2 supine rhythmic stabilization with shoulder at 90, 110, 120 over elbow and distal to elbow in all planes    Cueing 2 Verbal;Tactile;Demo   Reps 2 3   Time (Seconds) 2 30s ea   Exercise 3   Exercise Name 3 SA punches with mild resistance    Cueing 3 Verbal;Tactile;Demo   Equipment 3 Theraband   Resistance 3 Yellow   Sets 3 2   Reps 3 20   Exercise 4   Exercise Name 4 sm  shoulder/elbow perturbations using yellow tband; shouler at 120   (ML and AP perturbations )   Cueing 4 Verbal;Tactile   Equipment 4 Theraband   Resistance 4 Yellow   Reps 4 2   Time (Seconds) 4 30s ea way    Exercise 5   Exercise Name 5 eccentric ER ball catch in right sidelying; raquett ball    (wrist straight )   Cueing 5 Demo   Sets 5 2   Reps 5 10        03/03/17 1549   Manual Rx 1   Manual Rx 1 Location prone mid thoracic ext mob;  thoracic jane, rhomboids, and LS   (1 cavitation noted at T6-7 with report of relief post )   Manual Rx 1 Type STM focuson on L    Manual Rx 1 Duration 5   Manual Rx 2   Manual Rx 2 Location left elbow extension stretch   Manual Rx 2 Type towell roll under elbow    Manual Rx 2 Grade sustained repetition   Manual Rx 2 Duration 5 x 30 sec   Manual Rx 3   Manual Rx 3 Location L biceps insertion and pronator teres mm belly    Manual Rx 3 Type TrP and DTM    Manual Rx 3 Duration 5   Manual Rx 4   Manual Rx 4 Location supine to L UT TrP;    Manual Rx 4 Duration 3   Manual Rx 5   Manual Rx 5 Location L wrist extensors    Manual Rx 5 Type quick, light stretches of the wrist extensors with fingers passively flexed    Manual Rx 5 Grade light and quick   Manual Rx 5 Duration 2 sets of 5 holding 2-3s; pt reported less pain/tightness afterwards         03/03/17 1549   PT Short Term Goals   STG Date to Achieve 02/06/17   STG 1 Patient will demonstrate understanding of precautions post surgery.    STG 1 Progress Partially Met   STG 2 Pt will demonstrate improved swelling of the L elbow region within <0.5cm of the unaffected side.    STG 2 Progress Partially Met   STG 3 Pt will demonstrate decreased muscle guarding and guarded posturing on the surgical side.    STG 3 Progress Partially Met   STG 3 Progress Comments UT, biceps, pronator teres on L guarded today    STG 4 Pt will demonstrate full PROM on the affected side of the elbow.   STG 4 Progress Met   Long Term Goals   LTG Date to Achieve  03/06/17   LTG 1 Pt will illustrate full PROM of the wrist.    LTG 1 Progress Progressing   LTG 1 Progress Comments pt demonstrates full PROM that is painfree;  extensor tendon extensibility limited    LTG 2 Pt will illustrate  strength of >20lbs of the LUE with elbow by side.    LTG 2 Progress Ongoing   LTG 3 Pt will illustrate pinch  of LUE of >5lbs in order to begin to improve her functional ability with ADLs and work related actvity. .   LTG 3 Progress Ongoing   LTG 4 Pt will report ability to perform typing activity without increased pain and signs of fatigue.    LTG 4 Progress Met   LTG 5 Pt will report no pain with lifting <10lb objects at home and work or opening objects.    LTG 5 Progress Partially Met   LTG 6 Pt will demonstrate MMT of wrist extensors to 4/5 before discharge in order to return to her PLOF.    LTG 6 Progress Progressing   Time Calculation   PT Goal Re-Cert Due Date 03/10/17                                                            Time Calculation:   Start Time: 1549  Stop Time: 1634  Time Calculation (min): 45 min  Total Timed Code Minutes- PT: 45 minute(s)                  Macey Patel, PT  3/6/2017

## 2017-03-10 ENCOUNTER — HOSPITAL ENCOUNTER (OUTPATIENT)
Dept: PHYSICAL THERAPY | Facility: HOSPITAL | Age: 47
Setting detail: THERAPIES SERIES
Discharge: HOME | End: 2017-03-10

## 2017-03-10 DIAGNOSIS — Z48.89 AFTERCARE FOLLOWING SURGERY: Primary | ICD-10-CM

## 2017-03-10 DIAGNOSIS — S66.812A HAND AND WRIST EXTENSOR TENDON RUPTURE, LEFT, INITIAL ENCOUNTER: ICD-10-CM

## 2017-03-10 PROCEDURE — 97140 MANUAL THERAPY 1/> REGIONS: CPT

## 2017-03-10 PROCEDURE — 97110 THERAPEUTIC EXERCISES: CPT

## 2017-03-10 NOTE — PROGRESS NOTES
Outpatient Physical Therapy Ortho Progress Note  [facility]     Patient Name: Maribell Whitley  : 1970  MRN: [id number]  Today's Date: 3/10/2017      Visit Date: 03/10/2017    Visit Dx:    ICD-10-CM ICD-9-CM   1. Aftercare following surgery Z48.89 V58.89   2. Hand and wrist extensor tendon rupture, left, initial encounter S66.812A 842.10       There is no problem list on file for this patient.       No past medical history on file.     Past Surgical History   Procedure Laterality Date   • Hysterectomy     • Gastric bypass     • Cholecystectomy                               PT Assessment/Plan       03/10/17 1549       PT Assessment    Functional Limitations Limitations in functional capacity and performance;Performance in self-care ADL;Performance in leisure activities;Performance in work activities  -TC     Impairments Endurance;Impaired flexibility;Muscle strength;Pain;Joint mobility;Impaired postural alignment;Impaired muscle endurance;Impaired muscle length;Motor function;Range of motion;Posture  -TC     Assessment Comments Pt is now 11 weeks post op left extensor tendon repair. She has met 4/10 goals at this time and is progressing well towards the remaining. Her swelling, left wrist and elbow PROM and AROM along with ST restrictions have improved tremendously compared to her inital visit and her  strength on the left has improved by around 20lbs and within 10lbs from the right. Although she has improved significantly she still demonstrates poor thoracic mobility, scapular control and wrist and elbow strength limiting her ability to participate in ADLs and some work related actiivities. She is still on a 10lb weight restriction and we are progressing her stability and control with plans to initiate more strenghening in the coming weeks according to protocol.    -TC     Please refer to paper survey for additional self-reported information Yes  -TC     Rehab Potential Good  -TC      Patient/caregiver participated in establishment of treatment plan and goals Yes  -TC     Patient would benefit from skilled therapy intervention Yes  -TC     PT Plan    PT Frequency 2x/week  -TC     Predicted Duration of Therapy Intervention (days/wks) 6 weeks   -TC     Planned CPT's? PT THER PROC EA 15 MIN: 17037;PT THER ACT EA 15 MIN: 40521;PT MANUAL THERAPY EA 15 MIN: 31058;PT NEUROMUSC RE-EDUCATION EA 15 MIN: 64608;PT ELECTRICAL STIM UNATTEND: ;PT ELECTRICAL STIM ATTD EA 15 MIN: 36706  -TC     Physical Therapy Interventions (Optional Details) gross motor skills;neuromuscular re-education;strengthening;manual therapy techniques;ROM (Range of Motion);postural re-education;joint mobilization  -TC     PT Plan Comments We will continue to address her guarding and ST restictions of her thoracic, scapular and elbow regions to fully restore her ROM. Her scapular, shoulder, elbow and wrist stability and proprioception exercises will be progressed  as well in order to promote control during movement. Strengthening will be introduced moreso in the next few weeks according to protocol and then functional strengthening.  -TC       User Key  (r) = Recorded By, (t) = Taken By, (c) = Cosigned By    Initials Name Provider Type    TC Macey Patel, PT Physical Therapist                    Exercises       03/10/17 0179          Subjective Comments    Subjective Comments Pt reported she has not experienced any soreness or pain. She can still tell she is weak/fatigues easily   -TC      Subjective Pain    Able to rate subjective pain? yes  -TC      Pre-Treatment Pain Level 0  -TC      Post-Treatment Pain Level 0  -TC      Exercise 1    Exercise Name 1 Reassessment performed all goals addressed  -TC      Exercise 2    Exercise Name 2 supine rhythmic stabilization with shoulder at 90, 110, 120 over elbow and distal to elbow in all planes   -TC      Cueing 2 Verbal;Tactile;Demo  -TC      Reps 2 3  -TC      Time (Seconds) 2 30s  ea  -TC      Exercise 4    Exercise Name 4 sm shoulder/elbow perturbations using yellow tband; shouler at 120    AP perturbations   -TC      Cueing 4 Verbal;Tactile  -TC      Equipment 4 Theraband  -TC      Resistance 4 Yellow  -TC      Reps 4 2  -TC      Time (Seconds) 4 30s ea way   -TC      Exercise 5    Exercise Name 5 sm shoulder/elbow perturbations using yellow tband; shouler at 120 in scap plane ML pert   -TC      Cueing 5 Verbal  -TC      Sets 5 2  -TC      Time (Seconds) 5 30s ea way   -TC      Exercise 6    Exercise Name 6 SA punches   -TC      Cueing 6 Verbal  -TC      Equipment 6 Dumbell  -TC      Weights/Plates 6 2  -TC      Sets 6 2  -TC      Reps 6 12   began to fatigue after 12   -TC      Exercise 7    Exercise Name 7 eccentric ER ball catch in right sidelying; raquett ball     wrist in neutral   -TC      Cueing 7 Verbal  -TC      Sets 7 2  -TC      Time (Seconds) 7 45s   -TC      Exercise 8    Exercise Name 8 standing shoulder ER with elbow at 90; perturbations    flex bar green   -TC      Cueing 8 Verbal  -TC      Equipment 8 Theraband  -TC      Resistance 8 Yellow  -TC      Sets 8 2  -TC      Time (Seconds) 8 30s ea  -TC      Exercise 9    Exercise Name 9 standing statue [employer] LUE elbow ext perturbations   -TC      Cueing 9 Verbal  -TC      Equipment 9 Theraband  -TC      Resistance 9 Yellow  -TC      Reps 9 3  -TC      Time (Seconds) 9 10s ea  -TC        User Key  (r) = Recorded By, (t) = Taken By, (c) = Cosigned By    Initials Name Provider Type    TC Macey Patel, KARI Physical Therapist                        Manual Rx (last 36 hours)      Manual Treatments       03/10/17 1549          Manual Rx 1    Manual Rx 1 Location prone mid thoracic ext mob;  thoracic jane, rhomboids, and LS   -TC      Manual Rx 1 Type STM focuson on L   -TC      Manual Rx 1 Duration 5  -TC      Manual Rx 2    Manual Rx 2 Location Prone UT, LS, thoracic jane, rhomboids, and IS on Lt   -TC      Manual Rx 2  Type STM   -TC      Manual Rx 2 Grade --  -TC      Manual Rx 2 Duration 5  -TC      Manual Rx 3    Manual Rx 3 Location --  -TC      Manual Rx 3 Type --  -TC      Manual Rx 3 Duration --  -TC      Manual Rx 4    Manual Rx 4 Location --  -TC      Manual Rx 4 Duration --  -TC      Manual Rx 5    Manual Rx 5 Location L wrist extensors   -TC      Manual Rx 5 Type quick, light stretches of the wrist extensors with fingers passively flexed   -TC      Manual Rx 5 Grade light and quick  -TC      Manual Rx 5 Duration 2 sets of 5 holding 2-3s; pt reported less pain/tightness afterwards   -TC        User Key  (r) = Recorded By, (t) = Taken By, (c) = Cosigned By    Initials Name Provider Type    TC Macey Patel, PT Physical Therapist                PT OP Goals       03/10/17 1549       PT Short Term Goals    STG Date to Achieve 02/06/17  -TC     STG 1 Patient will demonstrate understanding of precautions post surgery.   -TC     STG 1 Progress Partially Met  -TC     STG 1 Progress Comments pt is still on a 10lb lifting restriction at this time  -TC     STG 2 Pt will demonstrate improved swelling of the L elbow region within <0.5cm of the unaffected side.   -TC     STG 2 Progress Met  -TC     STG 2 Progress Comments equal to uninvolved side   -TC     STG 3 Pt will demonstrate decreased muscle guarding and guarded posturing on the surgical side.   -TC     STG 3 Progress Met  -TC     STG 3 Progress Comments Pt no longer guards her LUE, she is functionally using her arm and rarely has pain  -TC     STG 4 Pt will demonstrate full PROM on the affected side of the elbow.  -TC     STG 4 Progress Met  -TC     Long Term Goals    LTG Date to Achieve 03/06/17  -TC     LTG 1 Pt will illustrate full PROM of the wrist.   -TC     LTG 1 Progress Progressing  -TC     LTG 1 Progress Comments 100 degrees wrist flexion and 80 deg wrist ext  -TC     LTG 2 Pt will illustrate  strength of >20lbs of the LUE with elbow by side.   -TC     LTG 2  Progress Progressing  -TC     LTG 2 Progress Comments Lt 60,61,62  Rt 75, 71,70  -TC     LTG 3 Pt will illustrate pinch  of LUE of >5lbs in order to begin to improve her functional ability with ADLs and work related actvity. .  -TC     LTG 3 Progress Progressing  -TC     LTG 3 Progress Comments Lt 11,13, 14  Rt 14, 15, 14  -TC     LTG 4 Pt will report ability to perform typing activity without increased pain and signs of fatigue.   -TC     LTG 4 Progress Met  -TC     LTG 5 Pt will report no pain with lifting <10lb objects at home and work or opening objects.   -TC     LTG 5 Progress Partially Met  -TC     LTG 5 Progress Comments pt is still on lifting restrictions at this time   -TC     LTG 6 Pt will demonstrate MMT of wrist extensors to 4/5 before discharge in order to return to her PLOF.   -TC     LTG 6 Progress Progressing  -TC     LTG 6 Progress Comments not much pressure applied but pt able to perform full range with some resistance applied at least 3+/5;   -TC     LTG 7 Pt will demonstrate full wrist AROM equal to uninvolved side.   -TC     LTG 7 Progress New  -TC     Time Calculation    PT Goal Re-Cert Due Date 04/10/17  -TC       User Key  (r) = Recorded By, (t) = Taken By, (c) = Cosigned By    Initials Name Provider Type    LUZ Patel PT Physical Therapist                Therapy Education       03/10/17 1657          Therapy Education    Given Symptoms/condition management;Posture/body mechanics  -TC      Program Reinforced  -TC      How Provided Verbal  -TC      Provided to Patient  -TC      Level of Understanding Verbalized  -TC        User Key  (r) = Recorded By, (t) = Taken By, (c) = Cosigned By    Initials Name Provider Type    LUZ Patel PT Physical Therapist                Time Calculation:   Start Time: 1549  Stop Time: 1635  Time Calculation (min): 46 min  Total Timed Code Minutes- PT: 46 minute(s)    Therapy Charges for Today     Code Description Service Date Service  Provider Modifiers Qty    23263839635 HC PT MANUAL THERAPY EA 15 MIN 3/10/2017 Macey Patel, PT GP 1    35121145442 HC PT THER PROC EA 15 MIN 3/10/2017 Macey Patel, PT GP 2                    Macey Patel, PT  3/10/2017

## 2017-03-14 ENCOUNTER — HOSPITAL ENCOUNTER (OUTPATIENT)
Dept: PHYSICAL THERAPY | Facility: HOSPITAL | Age: 47
Setting detail: THERAPIES SERIES
Discharge: HOME | End: 2017-03-14

## 2017-03-14 DIAGNOSIS — Z48.89 AFTERCARE FOLLOWING SURGERY: Primary | ICD-10-CM

## 2017-03-14 DIAGNOSIS — S66.812A HAND AND WRIST EXTENSOR TENDON RUPTURE, LEFT, INITIAL ENCOUNTER: ICD-10-CM

## 2017-03-14 PROCEDURE — 97110 THERAPEUTIC EXERCISES: CPT

## 2017-03-14 NOTE — PROGRESS NOTES
Outpatient Physical Therapy Ortho Treatment Note  [facility]     Patient Name: Maribell Whitley  : 1970  MRN: [id number]  Today's Date: 3/14/2017      Visit Date: 2017    Visit Dx:    ICD-10-CM ICD-9-CM   1. Aftercare following surgery Z48.89 V58.89   2. Hand and wrist extensor tendon rupture, left, initial encounter S66.812A 842.10       There is no problem list on file for this patient.       No past medical history on file.     Past Surgical History   Procedure Laterality Date   • Hysterectomy     • Gastric bypass     • Cholecystectomy                               PT Assessment/Plan       17 1600       PT Assessment    Assessment Comments Patient struggled today with the prone exercises today, but did not have any elbow pain.  The lower trap activation is still quite limited overall.  We will continue to work on improving left scapular mechanics with functional strengthening.  -RS     PT Plan    PT Plan Comments assess the response to today's exercise progression.  Promote functional strength of the left scapula and thoracic spine with overhead motion.  -RS       User Key  (r) = Recorded By, (t) = Taken By, (c) = Cosigned By    Initials Name Provider Type    RS Jonny Holman, PT DPT Physical Therapist                    Exercises       17 1600          Subjective Comments    Subjective Comments Patient reports no new elbow pain today or over the weekend.  The wrist is still quiite stiff.    -RS      Subjective Pain    Able to rate subjective pain? yes  -RS      Pre-Treatment Pain Level 0  -RS      Post-Treatment Pain Level 0  -RS      Exercise 1    Exercise Name 1 prone perturbation with (L) UE in 90 deg abduction:  held 1# therabar  -RS      Cueing 1 Tactile  -RS      Sets 1 4  -RS      Time (Minutes) 1 1  -RS      Exercise 2    Exercise Name 2 prone (L) UE ER with arm in 90 deg abduction:  used 1# therabar  -RS      Cueing 2 Tactile  -RS      Sets 2 4  -RS      Reps 2 10   -RS      Exercise 3    Exercise Name 3 (L) prone Y with 1# therabar  -RS      Cueing 3 Tactile  -RS      Sets 3 3  -RS      Reps 3 10  -RS      Time (Seconds) 3 2-3 sec hold  -RS      Exercise 4    Exercise Name 4 supine (L) shoulder flexion  with EO/EC  -RS      Cueing 4 Verbal;Tactile  -RS      Equipment 4 Dumbell  -RS      Weights/Plates 4 3  -RS      Sets 4 4  -RS      Reps 4 15  -RS      Exercise 5    Exercise Name 5 left unilateral facing wall slides with liftoff  -RS      Cueing 5 Verbal  -RS      Sets 5 1  -RS      Reps 5 15  -RS      Exercise 6    Exercise Name 6 supine left wrist extensor stretch with MP and IP flexion   mod sustained OP  -RS      Time (Minutes) 6 8   elbow bent to 90 deg and fully extended  -RS        User Key  (r) = Recorded By, (t) = Taken By, (c) = Cosigned By    Initials Name Provider Type    RS Jonny Holman, PT DPT Physical Therapist                               PT OP Goals       03/14/17 1600       PT Short Term Goals    STG Date to Achieve 02/06/17  -RS     STG 1 Patient will demonstrate understanding of precautions post surgery.   -RS     STG 1 Progress Partially Met  -RS     STG 2 Pt will demonstrate improved swelling of the L elbow region within <0.5cm of the unaffected side.   -RS     STG 2 Progress Met  -RS     STG 3 Pt will demonstrate decreased muscle guarding and guarded posturing on the surgical side.   -RS     STG 3 Progress Met  -RS     STG 4 Pt will demonstrate full PROM on the affected side of the elbow.  -RS     STG 4 Progress Met  -RS     Long Term Goals    LTG Date to Achieve 03/06/17  -RS     LTG 1 Pt will illustrate full PROM of the wrist.   -RS     LTG 1 Progress Progressing  -RS     LTG 2 Pt will illustrate  strength of >20lbs of the LUE with elbow by side.   -RS     LTG 2 Progress Progressing  -RS     LTG 3 Pt will illustrate pinch  of LUE of >5lbs in order to begin to improve her functional ability with ADLs and work related actvity. .   -RS     LTG 3 Progress Progressing  -RS     LTG 4 Pt will report ability to perform typing activity without increased pain and signs of fatigue.   -RS     LTG 4 Progress Met  -RS     LTG 5 Pt will report no pain with lifting <10lb objects at home and work or opening objects.   -RS     LTG 5 Progress Partially Met  -RS     LTG 6 Pt will demonstrate MMT of wrist extensors to 4/5 before discharge in order to return to her PLOF.   -RS     LTG 6 Progress Progressing  -RS     LTG 7 Pt will demonstrate full wrist AROM equal to uninvolved side.   -RS     LTG 7 Progress Ongoing  -RS     Time Calculation    PT Goal Re-Cert Due Date 04/10/17  -RS       User Key  (r) = Recorded By, (t) = Taken By, (c) = Cosigned By    Initials Name Provider Type    JASEN Holman, PT DPT Physical Therapist                Therapy Education       03/14/17 1600          Therapy Education    Given HEP  -RS      Program Reinforced  -RS      How Provided Verbal  -RS      Provided to Patient  -RS      Level of Understanding Verbalized  -RS        User Key  (r) = Recorded By, (t) = Taken By, (c) = Cosigned By    Initials Name Provider Type    JASEN Holman, PT DPT Physical Therapist                Time Calculation:   Start Time: 1545  Stop Time: 1630  Time Calculation (min): 45 min  PT Non-Billable Time (min): 4 min  Total Timed Code Minutes- PT: 41 minute(s)    Therapy Charges for Today     Code Description Service Date Service Provider Modifiers Qty    10345643444 HC PT THER PROC EA 15 MIN 3/14/2017 Jonny Holman, PT DPT GP 3                    SPIKE Holman PT DPT  3/14/2017

## 2017-03-17 ENCOUNTER — HOSPITAL ENCOUNTER (OUTPATIENT)
Dept: PHYSICAL THERAPY | Facility: HOSPITAL | Age: 47
Setting detail: THERAPIES SERIES
Discharge: HOME | End: 2017-03-17

## 2017-03-17 DIAGNOSIS — Z48.89 AFTERCARE FOLLOWING SURGERY: Primary | ICD-10-CM

## 2017-03-17 DIAGNOSIS — S66.812A HAND AND WRIST EXTENSOR TENDON RUPTURE, LEFT, INITIAL ENCOUNTER: ICD-10-CM

## 2017-03-17 PROCEDURE — 97140 MANUAL THERAPY 1/> REGIONS: CPT

## 2017-03-17 PROCEDURE — 97110 THERAPEUTIC EXERCISES: CPT

## 2017-03-17 NOTE — PROGRESS NOTES
Outpatient Physical Therapy Ortho Treatment Note  [facility]     Patient Name: Maribell Whitley  : 1970  MRN: [id number]  Today's Date: 3/17/2017      Visit Date: 2017    Visit Dx:    ICD-10-CM ICD-9-CM   1. Aftercare following surgery Z48.89 V58.89   2. Hand and wrist extensor tendon rupture, left, initial encounter S66.812A 842.10       There is no problem list on file for this patient.       No past medical history on file.     Past Surgical History   Procedure Laterality Date   • Hysterectomy     • Gastric bypass     • Cholecystectomy                               PT Assessment/Plan       17 1111       PT Assessment    Assessment Comments Patient continues to struggle and quickly fatigue with scapular stability and LUE stability exercises but nothing exacerbated her pain. We will continue to address these impairments in order to improve mechanics of motion, protect surgical site and begin lower level functioal strengthtening according to protocol.   -TC     PT Plan    PT Plan Comments see assessment, assesss her response to todays session.   -TC       User Key  (r) = Recorded By, (t) = Taken By, (c) = Cosigned By    Initials Name Provider Type    TC Macey Patel, PT Physical Therapist                    Exercises       17 0802          Subjective Comments    Subjective Comments Pt reports that she was not sore after last session.   -TC      Subjective Pain    Able to rate subjective pain? yes  -TC      Pre-Treatment Pain Level 0  -TC      Exercise 1    Exercise Name 1 sidelying L SA and scapular elevation     red physioball   -TC      Cueing 1 Demo  -TC      Sets 1 3  -TC      Reps 1 10  -TC      Exercise 2    Exercise Name 2 R sidelying; L shoulder IR/ER and elbow sup/pron w/ 1lb tbar; stability   -TC      Sets 2 4  -TC      Reps 2 12  -TC      Exercise 4    Exercise Name 4 prone unilateral lower trap lift offs    L   -TC      Cueing 4 Demo  -TC      Sets 4 3  -TC      Reps  4 15  -TC      Exercise 5    Exercise Name 5 prone 1lb tbar shoulder IR/ER (elbow straight0  w/o perturbations   -TC      Cueing 5 Demo  -TC      Sets 5 3  -TC      Reps 5 15  -TC      Exercise 6    Exercise Name 6 standing statue of liberty with self perturbations; lower trap activation   -TC      Cueing 6 Demo  -TC      Equipment 6 Theraband  -TC      Resistance 6 Yellow  -TC      Reps 6 5  -TC      Time (Seconds) 6 15s ea  -TC      Exercise 7    Exercise Name 7 standing abd in scap plane perturbations EO/EC  -TC      Cueing 7 Demo  -TC      Equipment 7 Theraband  -TC      Resistance 7 Yellow  -TC      Sets 7 5  -TC      Time (Minutes) 7 15s ea  -TC      Exercise 8    Exercise Name 8 seated red physioball scapular and shoulder PNF patterns avoiding over fatigue and pain    -TC      Cueing 8 Demo  -TC      Reps 8 3  -TC      Time (Seconds) 8 60 min ea  -TC        User Key  (r) = Recorded By, (t) = Taken By, (c) = Cosigned By    Initials Name Provider Type    TC Macey Patel, PT Physical Therapist                        Manual Rx (last 36 hours)      Manual Treatments       03/17/17 0802          Manual Rx 1    Manual Rx 1 Location R sidelying L medial border of scapula, rhomboids, UT, lower/mid trap  -TC      Manual Rx 1 Type STM   -TC      Manual Rx 1 Grade mod to deep   -TC      Manual Rx 1 Duration 10  -TC        User Key  (r) = Recorded By, (t) = Taken By, (c) = Cosigned By    Initials Name Provider Type    TC Macey Patel, KARI Physical Therapist                PT OP Goals       03/17/17 0802       PT Short Term Goals    STG Date to Achieve 02/06/17  -TC     STG 1 Patient will demonstrate understanding of precautions post surgery.   -TC     STG 1 Progress Partially Met  -TC     STG 2 Pt will demonstrate improved swelling of the L elbow region within <0.5cm of the unaffected side.   -TC     STG 2 Progress Met  -TC     STG 3 Pt will demonstrate decreased muscle guarding and guarded posturing on the  surgical side.   -TC     STG 3 Progress Met  -TC     STG 4 Pt will demonstrate full PROM on the affected side of the elbow.  -TC     STG 4 Progress Met  -TC     Long Term Goals    LTG Date to Achieve 03/06/17  -TC     LTG 1 Pt will illustrate full PROM of the wrist.   -TC     LTG 1 Progress Progressing  -TC     LTG 1 Progress Comments tendons still limiting her PROM into flexion  -TC     LTG 2 Pt will illustrate  strength of >20lbs of the LUE with elbow by side.   -TC     LTG 2 Progress Progressing  -TC     LTG 3 Pt will illustrate pinch  of LUE of >5lbs in order to begin to improve her functional ability with ADLs and work related actvity. .  -TC     LTG 3 Progress Progressing  -TC     LTG 4 Pt will report ability to perform typing activity without increased pain and signs of fatigue.   -TC     LTG 4 Progress Met  -TC     LTG 5 Pt will report no pain with lifting <10lb objects at home and work or opening objects.   -TC     LTG 5 Progress Partially Met  -TC     LTG 6 Pt will demonstrate MMT of wrist extensors to 4/5 before discharge in order to return to her PLOF.   -TC     LTG 6 Progress Progressing  -TC     LTG 7 Pt will demonstrate full wrist AROM equal to uninvolved side.   -TC     LTG 7 Progress Ongoing  -TC     LTG 7 Progress Comments improving quite well almost equal to uninvolved side   -TC     Time Calculation    PT Goal Re-Cert Due Date 04/10/17  -TC       User Key  (r) = Recorded By, (t) = Taken By, (c) = Cosigned By    Initials Name Provider Type    LUZ Patel, PT Physical Therapist                Therapy Education       03/17/17 1110          Therapy Education    Given Posture/body mechanics;HEP;Symptoms/condition management  -TC      Program Reinforced  -TC      How Provided Verbal  -TC      Provided to Patient  -TC      Level of Understanding Verbalized  -TC        User Key  (r) = Recorded By, (t) = Taken By, (c) = Cosigned By    Initials Name Provider Type    LUZ Patel,  PT Physical Therapist                Time Calculation:   Start Time: 0802  Stop Time: 0848  Time Calculation (min): 46 min  Total Timed Code Minutes- PT: 46 minute(s)    Therapy Charges for Today     Code Description Service Date Service Provider Modifiers Qty    89683929746 HC PT MANUAL THERAPY EA 15 MIN 3/17/2017 Macey Patel, PT GP 1    72082055917 HC PT THER PROC EA 15 MIN 3/17/2017 Macey Patel, PT GP 2                    Macey Patel, PT  3/17/2017

## 2017-03-21 ENCOUNTER — HOSPITAL ENCOUNTER (OUTPATIENT)
Dept: PHYSICAL THERAPY | Facility: HOSPITAL | Age: 47
Setting detail: THERAPIES SERIES
Discharge: HOME | End: 2017-03-21

## 2017-03-21 DIAGNOSIS — S66.812A HAND AND WRIST EXTENSOR TENDON RUPTURE, LEFT, INITIAL ENCOUNTER: ICD-10-CM

## 2017-03-21 DIAGNOSIS — Z48.89 AFTERCARE FOLLOWING SURGERY: Primary | ICD-10-CM

## 2017-03-21 PROCEDURE — 97110 THERAPEUTIC EXERCISES: CPT

## 2017-03-21 NOTE — PROGRESS NOTES
Outpatient Physical Therapy Ortho Treatment Note  [facility]     Patient Name: Maribell Whitley  : 1970  MRN: [id number]  Today's Date: 3/21/2017      Visit Date: 2017    Visit Dx:    ICD-10-CM ICD-9-CM   1. Aftercare following surgery Z48.89 V58.89   2. Hand and wrist extensor tendon rupture, left, initial encounter S66.812A 842.10       There is no problem list on file for this patient.       No past medical history on file.     Past Surgical History   Procedure Laterality Date   • Hysterectomy     • Gastric bypass     • Cholecystectomy                               PT Assessment/Plan       17 1600       PT Assessment    Assessment Comments The fatigue was again noted without pain in the left shoulder.  This was reported to be an issue while fishing over the weekend.  Global tricep, bicep, cuff, and deltoid strength are also improving and will continue to improve with treatment.  Patient is making great progress.  -RS     PT Plan    PT Plan Comments Adhere to restrictions which are technically lifted today (6 weeks).  Progress proximal stability  -RS       User Key  (r) = Recorded By, (t) = Taken By, (c) = Cosigned By    Initials Name Provider Type    RS Jonny Holman, PT DPT Physical Therapist                    Exercises       17 1500          Subjective Comments    Subjective Comments Pt has no new issues to report.  She is feeling good today.    -RS      Subjective Pain    Able to rate subjective pain? yes  -RS      Pre-Treatment Pain Level 0  -RS      Post-Treatment Pain Level 0  -RS      Exercise 1    Exercise Name 1 prone CW/CCW circles using 55 cm physioball  -RS      Sets 1 2  -RS      Reps 1 20  -RS      Exercise 2    Exercise Name 2 prone serratus slide using 55 cm physioball:  cues to drive with scapula.  (L) UE  -RS      Sets 2 3  -RS      Reps 2 15  -RS      Exercise 3    Exercise Name 3 CKC wall ball in 90 deg flexion and 90/90 position:  therapist provided  "perturbation  -RS      Reps 3 4  -RS      Time (Seconds) 3 30 (on each)  -RS      Exercise 4    Exercise Name 4 standing body blade:  elbow flex/ext and abd/add with arm by side  -RS      Cueing 4 Demo  -RS      Reps 4 3  -RS      Time (Seconds) 4 30 (on each)  -RS      Exercise 5    Exercise Name 5 supine \"dead stop\" press (L)  -RS      Cueing 5 Demo  -RS      Equipment 5 Dumbell  -RS      Weights/Plates 5 3  -RS      Sets 5 3  -RS      Reps 5 10  -RS      Exercise 6    Exercise Name 6 supine diagonal beginning at 90 deg flexion (up and out)   -RS      Cueing 6 Demo  -RS      Equipment 6 Dumbell  -RS      Weights/Plates 6 3  -RS      Sets 6 3  -RS      Reps 6 12  -RS      Exercise 7    Exercise Name 7 demonstration of new HEP  -RS        User Key  (r) = Recorded By, (t) = Taken By, (c) = Cosigned By    Initials Name Provider Type    RS Jonny Holman, PT DPT Physical Therapist                               PT OP Goals       03/21/17 1500       PT Short Term Goals    STG Date to Achieve 02/06/17  -RS     STG 1 Patient will demonstrate understanding of precautions post surgery.   -RS     STG 1 Progress Partially Met  -RS     STG 2 Pt will demonstrate improved swelling of the L elbow region within <0.5cm of the unaffected side.   -RS     STG 2 Progress Met  -RS     STG 3 Pt will demonstrate decreased muscle guarding and guarded posturing on the surgical side.   -RS     STG 3 Progress Met  -RS     STG 4 Pt will demonstrate full PROM on the affected side of the elbow.  -RS     STG 4 Progress Met  -RS     Long Term Goals    LTG Date to Achieve 03/06/17  -RS     LTG 1 Pt will illustrate full PROM of the wrist.   -RS     LTG 1 Progress Progressing  -RS     LTG 2 Pt will illustrate  strength of >20lbs of the LUE with elbow by side.   -RS     LTG 2 Progress Progressing  -RS     LTG 3 Pt will illustrate pinch  of LUE of >5lbs in order to begin to improve her functional ability with ADLs and work related " actvity. .  -RS     LTG 3 Progress Progressing  -RS     LTG 4 Pt will report ability to perform typing activity without increased pain and signs of fatigue.   -RS     LTG 4 Progress Met  -RS     LTG 5 Pt will report no pain with lifting <10lb objects at home and work or opening objects.   -RS     LTG 5 Progress Met  -RS     LTG 6 Pt will demonstrate MMT of wrist extensors to 4/5 before discharge in order to return to her PLOF.   -RS     LTG 6 Progress Progressing  -RS     LTG 7 Pt will demonstrate full wrist AROM equal to uninvolved side.   -RS     LTG 7 Progress Progressing  -RS     LTG 7 Progress Comments <10 deg limited  -RS     Time Calculation    PT Goal Re-Cert Due Date 04/10/17  -RS       User Key  (r) = Recorded By, (t) = Taken By, (c) = Cosigned By    Initials Name Provider Type    JASEN Holman, PT DPT Physical Therapist                Therapy Education       03/21/17 1600          Therapy Education    Given HEP  -RS      Program Progressed   Left UE weight bearing in wall push up position up to10/day   -RS      How Provided Demonstration;Verbal  -RS      Provided to Patient  -RS      Level of Understanding Demonstrated;Verbalized  -RS        User Key  (r) = Recorded By, (t) = Taken By, (c) = Cosigned By    Initials Name Provider Type    JASEN Holman, PT DPT Physical Therapist                Time Calculation:   Start Time: 1545  Stop Time: 1630  Time Calculation (min): 45 min  PT Non-Billable Time (min): 4 min  Total Timed Code Minutes- PT: 41 minute(s)    Therapy Charges for Today     Code Description Service Date Service Provider Modifiers Qty    37569920762 HC PT THER PROC EA 15 MIN 3/21/2017 Jonny Holman, PT DPT GP 3                    SPIKE Holman, PT DPT  3/21/2017

## 2017-03-24 ENCOUNTER — HOSPITAL ENCOUNTER (OUTPATIENT)
Dept: PHYSICAL THERAPY | Facility: HOSPITAL | Age: 47
Setting detail: THERAPIES SERIES
Discharge: HOME | End: 2017-03-24

## 2017-03-24 DIAGNOSIS — Z48.89 AFTERCARE FOLLOWING SURGERY: Primary | ICD-10-CM

## 2017-03-24 DIAGNOSIS — S66.812A HAND AND WRIST EXTENSOR TENDON RUPTURE, LEFT, INITIAL ENCOUNTER: ICD-10-CM

## 2017-03-24 PROCEDURE — 97110 THERAPEUTIC EXERCISES: CPT

## 2017-03-24 NOTE — PROGRESS NOTES
Outpatient Physical Therapy Ortho Treatment Note  [facility]     Patient Name: Maribell Whitley  : 1970  MRN: [id number]  Today's Date: 3/24/2017      Visit Date: 2017    Visit Dx:    ICD-10-CM ICD-9-CM   1. Aftercare following surgery Z48.89 V58.89   2. Hand and wrist extensor tendon rupture, left, initial encounter S66.812A 842.10       There is no problem list on file for this patient.       History reviewed. No pertinent past medical history.     Past Surgical History:   Procedure Laterality Date   • CHOLECYSTECTOMY     • GASTRIC BYPASS     • HYSTERECTOMY                               PT Assessment/Plan       17 1500       PT Assessment    Assessment Comments Patient did fatigue easily with the posterior cuff exercises today.  No elbow pain has been noted with the progressive exercises.  We will continue to progress posterior cuff endurance and strength.    -RS     PT Plan    PT Plan Comments continue with proximal stability and posterior cuff strength.  -RS       User Key  (r) = Recorded By, (t) = Taken By, (c) = Cosigned By    Initials Name Provider Type    RS Jonny Holman, PT DPT Physical Therapist                    Exercises       17 1500          Subjective Comments    Subjective Comments Patient denies any pain or soreness.  She is doing well.    -RS      Subjective Pain    Able to rate subjective pain? yes  -RS      Pre-Treatment Pain Level 0  -RS      Post-Treatment Pain Level 0  -RS      Exercise 1    Exercise Name 1 supine perturbations at 90 deg flexwith 22# ball  -RS      Reps 1 5  -RS      Time (Seconds) 1 30  -RS      Exercise 2    Exercise Name 2 supine flexion  with 2.2#ball  -RS      Cueing 2 Verbal  -RS      Sets 2 2  -RS      Reps 2 20  -RS      Exercise 3    Exercise Name 3 (R) sidelying:  (L) shoulder ER holds with perturbations using 2.2# ball   towel under elbow  -RS      Reps 3 10  -RS      Time (Seconds) 3 15 sec hold  -RS      Exercise 4     Exercise Name 4 standing (L) ER isometric with green therabar perturbations   towel under the elbow  -RS      Cueing 4 Demo  -RS      Equipment 4 Theraband  -RS      Resistance 4 Green  -RS      Reps 4 8  -RS      Time (Seconds) 4 20 sec hold  -RS      Exercise 5    Exercise Name 5 UE endurance test on curved side of BOSU:  standing with modified weight bearing  -RS      Reps 5 3  -RS      Time (Minutes) 5 1  -RS        User Key  (r) = Recorded By, (t) = Taken By, (c) = Cosigned By    Initials Name Provider Type    RS Jonny Holman, PT DPT Physical Therapist                               PT OP Goals       03/24/17 1500       PT Short Term Goals    STG Date to Achieve 02/06/17  -RS     STG 1 Patient will demonstrate understanding of precautions post surgery.   -RS     STG 1 Progress Partially Met  -RS     STG 2 Pt will demonstrate improved swelling of the L elbow region within <0.5cm of the unaffected side.   -RS     STG 2 Progress Met  -RS     STG 3 Pt will demonstrate decreased muscle guarding and guarded posturing on the surgical side.   -RS     STG 3 Progress Met  -RS     STG 4 Pt will demonstrate full PROM on the affected side of the elbow.  -RS     STG 4 Progress Met  -RS     Long Term Goals    LTG Date to Achieve 03/06/17  -RS     LTG 1 Pt will illustrate full PROM of the wrist.   -RS     LTG 1 Progress Progressing  -RS     LTG 2 Pt will illustrate  strength of >20lbs of the LUE with elbow by side.   -RS     LTG 2 Progress Progressing  -RS     LTG 3 Pt will illustrate pinch  of LUE of >5lbs in order to begin to improve her functional ability with ADLs and work related actvity. .  -RS     LTG 3 Progress Progressing  -RS     LTG 4 Pt will report ability to perform typing activity without increased pain and signs of fatigue.   -RS     LTG 4 Progress Met  -RS     LTG 5 Pt will report no pain with lifting <10lb objects at home and work or opening objects.   -RS     LTG 5 Progress Met  -RS     LTG  6 Pt will demonstrate MMT of wrist extensors to 4/5 before discharge in order to return to her PLOF.   -RS     LTG 6 Progress Progressing  -RS     LTG 7 Pt will demonstrate full wrist AROM equal to uninvolved side.   -RS     LTG 7 Progress Progressing  -RS     Time Calculation    PT Goal Re-Cert Due Date 04/10/17  -RS       User Key  (r) = Recorded By, (t) = Taken By, (c) = Cosigned By    Initials Name Provider Type    JASEN Holman, PT DPT Physical Therapist                Therapy Education       03/24/17 1500          Therapy Education    Given HEP;Symptoms/condition management  -RS      Program Reinforced  -RS      How Provided Verbal  -RS      Provided to Patient  -RS      Level of Understanding Verbalized  -RS        User Key  (r) = Recorded By, (t) = Taken By, (c) = Cosigned By    Initials Name Provider Type    JASEN Holman, PT DPT Physical Therapist                Time Calculation:   Start Time: 1315  Stop Time: 1345  Time Calculation (min): 30 min  PT Non-Billable Time (min): 2 min  Total Timed Code Minutes- PT: 28 minute(s)    Therapy Charges for Today     Code Description Service Date Service Provider Modifiers Qty    91352105253 HC PT THER PROC EA 15 MIN 3/24/2017 Jonny Holman, PT DPT GP 2                    SPIKE Holman, PT DPT  3/24/2017

## 2017-03-28 ENCOUNTER — HOSPITAL ENCOUNTER (OUTPATIENT)
Dept: PHYSICAL THERAPY | Facility: HOSPITAL | Age: 47
Setting detail: THERAPIES SERIES
Discharge: HOME | End: 2017-03-28

## 2017-03-28 DIAGNOSIS — S66.812A HAND AND WRIST EXTENSOR TENDON RUPTURE, LEFT, INITIAL ENCOUNTER: ICD-10-CM

## 2017-03-28 DIAGNOSIS — Z48.89 AFTERCARE FOLLOWING SURGERY: Primary | ICD-10-CM

## 2017-03-28 PROCEDURE — 97110 THERAPEUTIC EXERCISES: CPT

## 2017-03-28 NOTE — PROGRESS NOTES
Outpatient Physical Therapy Ortho Treatment Note  [facility]     Patient Name: Maribell Whitley  : 1970  MRN: [id number]  Today's Date: 3/28/2017      Visit Date: 2017    Visit Dx:    ICD-10-CM ICD-9-CM   1. Aftercare following surgery Z48.89 V58.89   2. Hand and wrist extensor tendon rupture, left, initial encounter S66.812A 842.10       There is no problem list on file for this patient.       No past medical history on file.     Past Surgical History:   Procedure Laterality Date   • CHOLECYSTECTOMY     • GASTRIC BYPASS     • HYSTERECTOMY                               PT Assessment/Plan       17 1510       PT Assessment    Assessment Comments Patient still shows signs of early fatigue with LUE activity especially in regards to posterior cuff strength and overall UE stability and endurance without report of increrased pain. Her scapular strength is improving, she demonstrates improved scapular winging and improved SA control during actvitiy.   -TC     PT Plan    PT Plan Comments We will continue to address her left UE shoulder and scapular stability and posterior cuff strength.   -TC       User Key  (r) = Recorded By, (t) = Taken By, (c) = Cosigned By    Initials Name Provider Type    TC Macey Patel, PT Physical Therapist                    Exercises       17 1415          Subjective Comments    Subjective Comments Pt reports that she fished all weekend   -TC      Subjective Pain    Able to rate subjective pain? yes  -TC      Pre-Treatment Pain Level 0  -TC      Post-Treatment Pain Level 0  -TC      Exercise 1    Exercise Name 1 supine perturbations 90 and 120 holding 2lb weight   -TC      Cueing 1 Verbal;Tactile  -TC      Time (Minutes) 1 5  -TC      Time (Seconds) 1 30s-45s ea  -TC      Exercise 2    Exercise Name 2 supine CW/CCW with 2lb wt   -TC      Cueing 2 Verbal  -TC      Sets 2 2  -TC      Reps 2 20  -TC      Exercise 3    Exercise Name 3 supine shoulder in scaption  IR/Er  -TC      Cueing 3 Verbal;Tactile  -TC      Equipment 3 --   2lb tbat   -TC      Sets 3 2  -TC      Reps 3 20  -TC      Exercise 4    Exercise Name 4 supine ML and AP self perturbations w/ 2lb wt  -TC      Sets 4 2  -TC      Reps 4 20  -TC      Exercise 5    Exercise Name 5 R sidelying; light resisted L ER with self perturbations  -TC      Cueing 5 Verbal  -TC      Reps 5 4  -TC      Exercise 6    Exercise Name 6 standing L ER perturbations with flexbar green   -TC      Cueing 6 Tactile  -TC      Time (Seconds) 6 15s x3  -TC      Exercise 7    Exercise Name 7 standing L Er perturbations with elbow straight    L shoulder in scaption 90 to 120  -TC      Cueing 7 Tactile  -TC      Time (Seconds) 7 15s x3  -TC      Exercise 8    Exercise Name 8 CKC BOSU black side ML wt shift   -TC      Cueing 8 Demo  -TC      Time (Seconds) 8 30s x3  -TC      Exercise 9    Exercise Name 9 CKC BOSU black side up Cw/WWC  -TC      Cueing 9 Demo  -TC      Time (Seconds) 9 30s ex3  -TC      Exercise 10    Exercise Name 10 CKC BOSU black side up push ups from 0 to 30 deg elbow ext   -TC      Cueing 10 Demo  -TC      Time (Seconds) 10 30s x3  -TC      Exercise 11    Exercise Name 11 Body blade ML perturbations; able to hold for 1.53 mins  -TC      Time (Minutes) 11 1.53 m  -TC        User Key  (r) = Recorded By, (t) = Taken By, (c) = Cosigned By    Initials Name Provider Type    TC Macey Zeina Patel, PT Physical Therapist                               PT OP Goals       03/28/17 1415       PT Short Term Goals    STG Date to Achieve 02/06/17  -TC     STG 1 Patient will demonstrate understanding of precautions post surgery.   -TC     STG 1 Progress Met  -TC     STG 1 Progress Comments pt is technically off of restrictions at this time but she still understands certain precautions regarding therapy and strengthening at this time  -TC     STG 2 Pt will demonstrate improved swelling of the L elbow region within <0.5cm of the unaffected side.    -TC     STG 2 Progress Met  -TC     STG 3 Pt will demonstrate decreased muscle guarding and guarded posturing on the surgical side.   -TC     STG 3 Progress Met  -TC     STG 4 Pt will demonstrate full PROM on the affected side of the elbow.  -TC     STG 4 Progress Met  -TC     Long Term Goals    LTG Date to Achieve 03/06/17  -TC     LTG 1 Pt will illustrate full PROM of the wrist.   -TC     LTG 1 Progress Progressing  -TC     LTG 2 Pt will illustrate  strength of >20lbs of the LUE with elbow by side.   -TC     LTG 2 Progress Progressing  -TC     LTG 3 Pt will illustrate pinch  of LUE of >5lbs in order to begin to improve her functional ability with ADLs and work related actvity. .  -TC     LTG 3 Progress Progressing  -TC     LTG 4 Pt will report ability to perform typing activity without increased pain and signs of fatigue.   -TC     LTG 4 Progress Met  -TC     LTG 5 Pt will report no pain with lifting <10lb objects at home and work or opening objects.   -TC     LTG 5 Progress Met  -TC     LTG 6 Pt will demonstrate MMT of wrist extensors to 4/5 before discharge in order to return to her PLOF.   -TC     LTG 6 Progress Progressing  -TC     LTG 6 Progress Comments we are working towards this, first addressing proximal strength  -TC     LTG 7 Pt will demonstrate full wrist AROM equal to uninvolved side.   -TC     LTG 7 Progress Progressing  -TC     Time Calculation    PT Goal Re-Cert Due Date 04/10/17  -TC       User Key  (r) = Recorded By, (t) = Taken By, (c) = Cosigned By    Initials Name Provider Type    TC Macey Patel, PT Physical Therapist                Therapy Education       03/28/17 1510          Therapy Education    Given HEP;Posture/body mechanics  -TC      Program Reinforced  -TC      How Provided Verbal  -TC      Provided to Patient  -TC      Level of Understanding Verbalized  -TC        User Key  (r) = Recorded By, (t) = Taken By, (c) = Cosigned By    Initials Name Provider Type    TC  Macey Patel, PT Physical Therapist                Time Calculation:   Start Time: 1415  Stop Time: 1500  Time Calculation (min): 45 min  Total Timed Code Minutes- PT: 45 minute(s)    Therapy Charges for Today     Code Description Service Date Service Provider Modifiers Qty    93463020917  PT THER PROC EA 15 MIN 3/28/2017 Macey Patel, PT GP 3                    Macey Patel, PT  3/28/2017

## 2017-03-31 ENCOUNTER — HOSPITAL ENCOUNTER (OUTPATIENT)
Dept: PHYSICAL THERAPY | Facility: HOSPITAL | Age: 47
Setting detail: THERAPIES SERIES
Discharge: HOME | End: 2017-03-31

## 2017-03-31 DIAGNOSIS — S66.812A HAND AND WRIST EXTENSOR TENDON RUPTURE, LEFT, INITIAL ENCOUNTER: ICD-10-CM

## 2017-03-31 DIAGNOSIS — Z48.89 AFTERCARE FOLLOWING SURGERY: Primary | ICD-10-CM

## 2017-03-31 PROCEDURE — 97110 THERAPEUTIC EXERCISES: CPT

## 2017-04-07 ENCOUNTER — HOSPITAL ENCOUNTER (OUTPATIENT)
Dept: PHYSICAL THERAPY | Facility: HOSPITAL | Age: 47
Setting detail: THERAPIES SERIES
Discharge: HOME | End: 2017-04-07

## 2017-04-07 DIAGNOSIS — S66.812A HAND AND WRIST EXTENSOR TENDON RUPTURE, LEFT, INITIAL ENCOUNTER: ICD-10-CM

## 2017-04-07 DIAGNOSIS — Z48.89 AFTERCARE FOLLOWING SURGERY: Primary | ICD-10-CM

## 2017-04-07 PROCEDURE — 97110 THERAPEUTIC EXERCISES: CPT

## 2017-04-07 NOTE — PROGRESS NOTES
Outpatient Physical Therapy Ortho Progress Note  [facility]     Patient Name: Maribell Whitley  : 1970  MRN: [id number]  Today's Date: 2017      Visit Date: 2017    Visit Dx:    ICD-10-CM ICD-9-CM   1. Aftercare following surgery Z48.89 V58.89   2. Hand and wrist extensor tendon rupture, left, initial encounter S66.812A 842.10       There is no problem list on file for this patient.       No past medical history on file.     Past Surgical History:   Procedure Laterality Date   • CHOLECYSTECTOMY     • GASTRIC BYPASS     • HYSTERECTOMY                               PT Assessment/Plan       17 1600       PT Assessment    Functional Limitations Limitations in functional capacity and performance;Performance in self-care ADL;Performance in leisure activities;Performance in work activities  -RS     Impairments Endurance;Impaired flexibility;Muscle strength;Pain;Joint mobility;Impaired postural alignment;Impaired muscle endurance;Impaired muscle length;Motor function;Range of motion;Posture  -RS     Assessment Comments The patient has made excellent progress with the new focus on proximal control and scapular strength.  The patient is restriction free at this time.   strength is 25# on average different when tested today.  Quick DASH score is <15% limited at this time with most difficulty with shoulder strength with overhead motion.  We will decrease the plan of care as we approach discharge.  Thank you for allowing us to work with this patient.  -RS     Please refer to paper survey for additional self-reported information Yes  -RS     Rehab Potential Excellent  -RS     Patient/caregiver participated in establishment of treatment plan and goals Yes  -RS     Patient would benefit from skilled therapy intervention Yes  -RS     PT Plan    PT Frequency 1x/week  -RS     Predicted Duration of Therapy Intervention (days/wks) 4 weeks  -RS     Planned CPT's? PT THER PROC EA 15 MIN: 64700;PT MANUAL  THERAPY EA 15 MIN: 71719;PT NEUROMUSC RE-EDUCATION EA 15 MIN: 43528;PT HOT OR COLD PACK TREAT MCARE;PT ELECTRICAL STIM UNATTEND: ;PT ELECTRICAL STIM ATTD EA 15 MIN: 69091  -RS     PT Plan Comments We will continue to progress proximal scapular strength, lower trap recruitment/strength, and progress functional overhead strengthening.  We will transition to a full gym based program over the next few weeks.  -RS       User Key  (r) = Recorded By, (t) = Taken By, (c) = Cosigned By    Initials Name Provider Type    RS Jonny Holman, PT DPT Physical Therapist                    Exercises       04/07/17 1500          Subjective Comments    Subjective Comments Patient is still quite tense from a stressful week.  The elbow is not hurting at this time with mostly trap soreness and guarding.  -RS      Subjective Pain    Able to rate subjective pain? yes  -RS      Pre-Treatment Pain Level 0  -RS      Post-Treatment Pain Level 0  -RS      Subjective Pain Comment 3/10  -RS      Exercise 1    Exercise Name 1 supine flexion  with 2.2#ball  -RS      Cueing 1 Verbal;Tactile  -RS      Sets 1 2  -RS      Reps 1 10  -RS      Exercise 2    Exercise Name 2 supine CW/CCW with 2lb wt   -RS      Cueing 2 Verbal  -RS      Sets 2 2  -RS      Reps 2 20  -RS      Exercise 3    Exercise Name 3 (R) sidelying:  (L) shoulder ER holds with perturbations using 2.2# ball   towel under elbow  -RS      Reps 3 10  -RS      Time (Seconds) 3 15 sec hold  -RS      Exercise 4    Exercise Name 4 goals assessed for recertification:  see goals section for details.  Patient also filled out the quick DASH  -RS      Exercise 5    Exercise Name 5 R sidelying; light resisted L ER with self perturbations  -RS      Cueing 5 Verbal  -RS      Reps 5 5  -RS      Time (Seconds) 5 15s holds   -RS      Exercise 6    Exercise Name 6 prone SA slides elbow bent using 55cm physioball    L CKC   -RS      Cueing 6 Demo  -RS      Sets 6 1  -RS      Reps 6 10  -RS       Exercise 7    Exercise Name 7 prone SA slides elbow straight using 55cm physioball    CKC L ; fatigue noted   -RS      Cueing 7 Demo  -RS      Sets 7 2  -RS      Reps 7 20  -RS      Exercise 8    Exercise Name 8 prone L middle trap lift offs    elbow ext ; fatigue noted   -RS      Cueing 8 Demo  -RS      Equipment 8 Dumbell  -RS      Weights/Plates 8 1  -RS      Sets 8 2  -RS      Reps 8 15  -RS        User Key  (r) = Recorded By, (t) = Taken By, (c) = Cosigned By    Initials Name Provider Type    JASEN Holman, PT DPT Physical Therapist                        Manual Rx (last 36 hours)      Manual Treatments       04/07/17 1500          Manual Rx 1    Manual Rx 1 Location katerina upper traps, levator  -RS      Manual Rx 1 Type STM  -RS      Manual Rx 1 Grade mod OP  -RS      Manual Rx 1 Duration 20  -RS        User Key  (r) = Recorded By, (t) = Taken By, (c) = Cosigned By    Initials Name Provider Type    JASEN Holman, PT DPT Physical Therapist                PT OP Goals       04/07/17 1500       PT Short Term Goals    STG Date to Achieve 02/06/17  -RS     STG 1 Patient will demonstrate understanding of precautions post surgery.   -RS     STG 1 Progress Met  -RS     STG 2 Pt will demonstrate improved swelling of the L elbow region within <0.5cm of the unaffected side.   -RS     STG 2 Progress Met  -RS     STG 3 Pt will demonstrate decreased muscle guarding and guarded posturing on the surgical side.   -RS     STG 3 Progress Met  -RS     STG 4 Pt will demonstrate full PROM on the affected side of the elbow.  -RS     STG 4 Progress Met  -RS     Long Term Goals    LTG Date to Achieve 05/05/17  -RS     LTG 1 Pt will illustrate full PROM of the wrist.   -RS     LTG 1 Progress Met  -RS     LTG 2 Pt will illustrate  strength of >20lbs of the LUE with elbow by side.   -RS     LTG 2 Progress Met  -RS     LTG 2 Progress Comments (R) 60# average; (L) 35# average (position number 3 on dynamometer)   -RS     LTG 3 Pt will illustrate pinch  of LUE of >5lbs in order to begin to improve her functional ability with ADLs and work related actvity. .  -RS     LTG 3 Progress Met  -RS     LTG 4 Pt will report ability to perform typing activity without increased pain and signs of fatigue.   -RS     LTG 4 Progress Met  -RS     LTG 5 Pt will report no pain with lifting <10lb objects at home and work or opening objects.   -RS     LTG 5 Progress Met  -RS     LTG 6 Pt will demonstrate MMT of wrist extensors to 4/5 before discharge in order to return to her PLOF.   -RS     LTG 6 Progress Met  -RS     LTG 7 Pt will demonstrate full wrist AROM equal to uninvolved side.   -RS     LTG 7 Progress Partially Met  -RS     LTG 7 Progress Comments <25% limit active wrist flexion (L); ECRB restriction minimally.  -RS     LTG 8 Patient will score <10 on the Quick DASH in relation to the left LE functional use by discharge.  -RS     LTG 8 Progress New  -RS     Time Calculation    PT Goal Re-Cert Due Date 05/07/17  -RS       User Key  (r) = Recorded By, (t) = Taken By, (c) = Cosigned By    Initials Name Provider Type    RS Jonny Holman, PT DPT Physical Therapist                    Outcome Measures       04/07/17 1600          Quick DASH    Open a tight or new jar. 3  -RS      Do heavy household chores (e.g., wash walls, wash floors) 2  -RS      Carry a shopping bag or briefcase 2  -RS      Wash your back 1  -RS      Use a knife to cut food 1  -RS      Recreational activities in which you take some force or impact through your arm, should or hand (e.g. golf, hammering, tennis, etc.) 2  -RS      During the past week, to what extent has your arm, shoulder, or hand problem interfered with your normal social activites with family, friends, neighbors or groups? 1  -RS      During the past week, were you limited in your work or other regular daily activities as a result of your arm, shoulder or hand problem? 1  -RS      Arm, Shoulder,  or hand pain 1  -RS      Tingling (pins and needles) in your arm, shoulder, or hand 1  -RS      During the past week, how much difficulty have you had sleeping because of the pain in your arm, shoulder or hand? 1  -RS      Number of Questions Answered 11  -RS      Quick DASH Score 11.36  -RS      Functional Assessment    Outcome Measure Options Quick DASH  -RS        User Key  (r) = Recorded By, (t) = Taken By, (c) = Cosigned By    Initials Name Provider Type    RS Jonny Holman, PT DPT Physical Therapist            Time Calculation:   Start Time: 1545  Stop Time: 1630  Time Calculation (min): 45 min  Total Timed Code Minutes- PT: 45 minute(s)    Therapy Charges for Today     Code Description Service Date Service Provider Modifiers Qty    51877698319 HC PT THER PROC EA 15 MIN 4/7/2017 Jonny Holman, PT DPT GP 3          PT G-Codes  Outcome Measure Options: Quick DASH         SPIKE Holman, PT DPT  4/7/2017

## 2017-04-11 ENCOUNTER — HOSPITAL ENCOUNTER (OUTPATIENT)
Dept: PHYSICAL THERAPY | Facility: HOSPITAL | Age: 47
Setting detail: THERAPIES SERIES
Discharge: HOME | End: 2017-04-11

## 2017-04-11 DIAGNOSIS — S66.812A HAND AND WRIST EXTENSOR TENDON RUPTURE, LEFT, INITIAL ENCOUNTER: ICD-10-CM

## 2017-04-11 DIAGNOSIS — Z48.89 AFTERCARE FOLLOWING SURGERY: Primary | ICD-10-CM

## 2017-04-11 PROCEDURE — 97110 THERAPEUTIC EXERCISES: CPT

## 2017-04-11 NOTE — PROGRESS NOTES
Outpatient Physical Therapy Ortho Treatment Note  [facility]     Patient Name: Maribell Whitley  : 1970  MRN: [id number]  Today's Date: 2017      Visit Date: 2017    Visit Dx:    ICD-10-CM ICD-9-CM   1. Aftercare following surgery Z48.89 V58.89   2. Hand and wrist extensor tendon rupture, left, initial encounter S66.812A 842.10       There is no problem list on file for this patient.       No past medical history on file.     Past Surgical History:   Procedure Laterality Date   • CHOLECYSTECTOMY     • GASTRIC BYPASS     • HYSTERECTOMY                               PT Assessment/Plan       17 1600       PT Assessment    Assessment Comments The overhead motion today did demonstrate weakness of the posterior cuff.  The scapular upward rotation is still limited when she is not cued on a consistent basis.  We will decrease the frequency to 1x/week and likely d/c after the next 1-2 sessions.  -RS     PT Plan    PT Plan Comments Assess the effects of the HEP progression; assess readiness for discharge after 1-2 sessions.  -RS       User Key  (r) = Recorded By, (t) = Taken By, (c) = Cosigned By    Initials Name Provider Type    RS Jonny Holman, PT DPT Physical Therapist                    Exercises       17 1600          Subjective Comments    Subjective Comments Patient had left elbow pain on  for the first time in quite a while.  This pain is better today.  -RS      Subjective Pain    Able to rate subjective pain? yes  -RS      Pre-Treatment Pain Level 0  -RS      Post-Treatment Pain Level 0  -RS      Exercise 1    Exercise Name 1 prone thoracic extension stretch  -RS      Time (Minutes) 1 5  -RS      Exercise 2    Exercise Name 2 prone:  (L) horizontal abduction to mid axillary line with global left UE IR/ER  -RS      Cueing 2 Verbal;Tactile  -RS      Equipment 2 Dowel  -RS      Weights/Plates 2 2  -RS      Sets 2 3  -RS      Reps 2 15  -RS      Exercise 3    Exercise  Name 3 prone:  Murray rows with therapist supporting left UE  -RS      Cueing 3 Verbal;Demo  -RS      Sets 3 4  -RS      Reps 3 8  -RS      Exercise 4    Exercise Name 4 supine:  PNF flexion with therapist providing counterpressure for posterior cuff  -RS      Cueing 4 Verbal;Tactile  -RS      Sets 4 4  -RS      Reps 4 10  -RS      Exercise 5    Exercise Name 5 (L) shoulder standing Murray press  -RS      Cueing 5 Verbal  -RS      Equipment 5 Theraband  -RS      Resistance 5 Yellow  -RS      Sets 5 3  -RS      Reps 5 12  -RS      Exercise 6    Exercise Name 6 katerina shoulder wall walks  -RS      Cueing 6 Demo  -RS      Equipment 6 Theraband  -RS      Resistance 6 Yellow  -RS      Sets 6 3  -RS      Reps 6 5   1 rep = up and then down the wall to max range  -RS      Exercise 7    Exercise Name 7 instructed on HEP progression  -RS        User Key  (r) = Recorded By, (t) = Taken By, (c) = Cosigned By    Initials Name Provider Type    RS Jonny Holman, PT DPT Physical Therapist                               PT OP Goals       04/11/17 1500       PT Short Term Goals    STG Date to Achieve 02/06/17  -RS     STG 1 Patient will demonstrate understanding of precautions post surgery.   -RS     STG 1 Progress Met  -RS     STG 2 Pt will demonstrate improved swelling of the L elbow region within <0.5cm of the unaffected side.   -RS     STG 2 Progress Met  -RS     STG 3 Pt will demonstrate decreased muscle guarding and guarded posturing on the surgical side.   -RS     STG 3 Progress Met  -RS     STG 4 Pt will demonstrate full PROM on the affected side of the elbow.  -RS     STG 4 Progress Met  -RS     Long Term Goals    LTG Date to Achieve 05/05/17  -RS     LTG 1 Pt will illustrate full PROM of the wrist.   -RS     LTG 1 Progress Met  -RS     LTG 2 Pt will illustrate  strength of >20lbs of the LUE with elbow by side.   -RS     LTG 2 Progress Met  -RS     LTG 3 Pt will illustrate pinch  of LUE of >5lbs in order  to begin to improve her functional ability with ADLs and work related actvity. .  -RS     LTG 3 Progress Met  -RS     LTG 4 Pt will report ability to perform typing activity without increased pain and signs of fatigue.   -RS     LTG 4 Progress Met  -RS     LTG 5 Pt will report no pain with lifting <10lb objects at home and work or opening objects.   -RS     LTG 5 Progress Met  -RS     LTG 6 Pt will demonstrate MMT of wrist extensors to 4/5 before discharge in order to return to her PLOF.   -RS     LTG 6 Progress Met  -RS     LTG 7 Pt will demonstrate full wrist AROM equal to uninvolved side.   -RS     LTG 7 Progress Partially Met  -RS     LTG 8 Patient will score <10 on the Quick DASH in relation to the left LE functional use by discharge.  -RS     LTG 8 Progress Ongoing  -RS       User Key  (r) = Recorded By, (t) = Taken By, (c) = Cosigned By    Initials Name Provider Type    RS Jonny Holman, PT DPT Physical Therapist                    Time Calculation:   Start Time: 1545  Stop Time: 1630  Time Calculation (min): 45 min  PT Non-Billable Time (min): 4 min  Total Timed Code Minutes- PT: 41 minute(s)    Therapy Charges for Today     Code Description Service Date Service Provider Modifiers Qty    62692915737 HC PT THER PROC EA 15 MIN 4/11/2017 Jonny Holman, PT DPT GP 3                    SPIKE Holman, PT DPT  4/11/2017

## 2017-04-14 ENCOUNTER — APPOINTMENT (OUTPATIENT)
Dept: PHYSICAL THERAPY | Facility: HOSPITAL | Age: 47
End: 2017-04-14

## 2017-04-18 ENCOUNTER — HOSPITAL ENCOUNTER (OUTPATIENT)
Dept: PHYSICAL THERAPY | Facility: HOSPITAL | Age: 47
Setting detail: THERAPIES SERIES
Discharge: HOME | End: 2017-04-18

## 2017-04-18 DIAGNOSIS — S66.812A HAND AND WRIST EXTENSOR TENDON RUPTURE, LEFT, INITIAL ENCOUNTER: ICD-10-CM

## 2017-04-18 DIAGNOSIS — Z48.89 AFTERCARE FOLLOWING SURGERY: Primary | ICD-10-CM

## 2017-04-18 PROCEDURE — 97110 THERAPEUTIC EXERCISES: CPT

## 2017-04-18 NOTE — THERAPY DISCHARGE NOTE
Outpatient Physical Therapy Ortho Treatment Note/Discharge Summary  [facility]     Patient Name: Maribell Whitley  : 1970  MRN: [id number]  Today's Date: 2017      Visit Date: 2017    Visit Dx:    ICD-10-CM ICD-9-CM   1. Aftercare following surgery Z48.89 V58.89   2. Hand and wrist extensor tendon rupture, left, initial encounter S66.812A 842.10       There is no problem list on file for this patient.       No past medical history on file.     Past Surgical History:   Procedure Laterality Date   • CHOLECYSTECTOMY     • GASTRIC BYPASS     • HYSTERECTOMY                               PT Assessment/Plan       17 1343       PT Assessment    Assessment Comments Pt met 11/12 goals at this time partially meeting the remaining regarding full extensor strength on the left. She demonstrates full ROM throughout on the left and significant improvements in scapular, shoulder and wrist strength further advancing her towards her functional, work and leisure goals and activities and preventing her from further injury in the future. We are releasing her at this time from therapy services due to her goal achievement and ability to independently maintain her gains in therapy and progress towards more strengthening on her own. Thank you for this referral, we enjoyed working with her.   -TC     PT Plan    PT Plan Comments We are discharging this patient at this time.   -TC       User Key  (r) = Recorded By, (t) = Taken By, (c) = Cosigned By    Initials Name Provider Type    TC Macey Patel, PT Physical Therapist                    Exercises       17 1343          Subjective Pain    Able to rate subjective pain? yes  -TC      Exercise 1    Exercise Name 1 UE endurance test  -TC      Reps 1 4   28, 25, 16,   -TC      Time (Minutes) 1 1 min each  -TC      Exercise 2    Exercise Name 2 modified push ups with UE on flat side of BOSU   -TC      Sets 2 4  -TC      Reps 2 10  -TC      Exercise 3     Exercise Name 3 L resisted shoulder ER and press     Cybex; 10lbs  -TC      Sets 3 3  -TC      Reps 3 15  -TC      Exercise 4    Exercise Name 4 resisted lower trap activation in standing   -TC      Cueing 4 Demo  -TC      Equipment 4 Theraband  -TC      Sets 4 3  -TC      Reps 4 15  -TC      Exercise 5    Exercise Name 5 full SA wall slides   -TC      Cueing 5 Demo  -TC      Sets 5 3  -TC      Reps 5 20  -TC        User Key  (r) = Recorded By, (t) = Taken By, (c) = Cosigned By    Initials Name Provider Type    TC Macey Patel, PT Physical Therapist                               PT OP Goals       04/18/17 1343       PT Short Term Goals    STG Date to Achieve 02/06/17  -TC     STG 1 Patient will demonstrate understanding of precautions post surgery.   -TC     STG 1 Progress Met  -TC     STG 2 Pt will demonstrate improved swelling of the L elbow region within <0.5cm of the unaffected side.   -TC     STG 2 Progress Met  -TC     STG 3 Pt will demonstrate decreased muscle guarding and guarded posturing on the surgical side.   -TC     STG 3 Progress Met  -TC     STG 4 Pt will demonstrate full PROM on the affected side of the elbow.  -TC     STG 4 Progress Met  -TC     Long Term Goals    LTG Date to Achieve 05/05/17  -TC     LTG 1 Pt will illustrate full PROM of the wrist.   -TC     LTG 1 Progress Met  -TC     LTG 2 Pt will illustrate  strength of >20lbs of the LUE with elbow by side.   -TC     LTG 2 Progress Met  -TC     LTG 3 Pt will illustrate pinch  of LUE of >5lbs in order to begin to improve her functional ability with ADLs and work related actvity. .  -TC     LTG 3 Progress Met  -TC     LTG 4 Pt will report ability to perform typing activity without increased pain and signs of fatigue.   -TC     LTG 4 Progress Met  -TC     LTG 5 Pt will report no pain with lifting <10lb objects at home and work or opening objects.   -TC     LTG 5 Progress Met  -TC     LTG 6 Pt will demonstrate MMT of wrist extensors to  4/5 before discharge in order to return to her PLOF.   -TC     LTG 6 Progress Met  -TC     LTG 7 Pt will demonstrate full wrist AROM equal to uninvolved side.   -TC     LTG 7 Progress Partially Met  -TC     LTG 7 Progress Comments Pt demonstrated full wrist AROM but lacked full most medial phalangeal extension   -TC     LTG 8 Patient will score <10 on the Quick DASH in relation to the left LE functional use by discharge.  -TC     LTG 8 Progress Met  -TC     LTG 8 Progress Comments 4.55 today; signfiicant improvements made   -TC     Time Calculation    PT Goal Re-Cert Due Date 05/07/17  -TC       User Key  (r) = Recorded By, (t) = Taken By, (c) = Cosigned By    Initials Name Provider Type    TC Macey Patel, PT Physical Therapist                Therapy Education       04/18/17 1439          Therapy Education    Given HEP  -TC      Program New   and reinforced; added shoulder abduction with lower trap activation and end range using yellow tband; reinforced and encouraged to resume all other HEP for maintenance   -TC      How Provided Verbal;Demonstration;Written  -TC      Provided to Patient  -TC      Level of Understanding Teach back education performed;Verbalized;Demonstrated  -TC        User Key  (r) = Recorded By, (t) = Taken By, (c) = Cosigned By    Initials Name Provider Type    TC Macey Patel, PT Physical Therapist                Outcome Measures       04/18/17 1400          Quick DASH    Open a tight or new jar. 3  -TC      Do heavy household chores (e.g., wash walls, wash floors) 1  -TC      Carry a shopping bag or briefcase 1  -TC      Wash your back 1  -TC      Use a knife to cut food 1  -TC      Recreational activities in which you take some force or impact through your arm, should or hand (e.g. golf, hammering, tennis, etc.) 1  -TC      During the past week, to what extent has your arm, shoulder, or hand problem interfered with your normal social activites with family, friends, neighbors or  groups? 1  -TC      During the past week, were you limited in your work or other regular daily activities as a result of your arm, shoulder or hand problem? 1  -TC      Arm, Shoulder, or hand pain 1  -TC      Tingling (pins and needles) in your arm, shoulder, or hand 1  -TC      During the past week, how much difficulty have you had sleeping because of the pain in your arm, shoulder or hand? 1  -TC      Number of Questions Answered 11  -TC      Quick DASH Score 4.55  -TC      Functional Assessment    Outcome Measure Options Quick DASH  -TC        User Key  (r) = Recorded By, (t) = Taken By, (c) = Cosigned By    Initials Name Provider Type    TC Macey Patel, PT Physical Therapist            Time Calculation:   Start Time: 1343  Stop Time: 1427  Time Calculation (min): 44 min  Total Timed Code Minutes- PT: 44 minute(s)    Therapy Charges for Today     Code Description Service Date Service Provider Modifiers Qty    08135974477 HC PT THER PROC EA 15 MIN 4/18/2017 Macey Patel, PT GP 3          PT G-Codes  Outcome Measure Options: Quick DASH     OP PT Discharge Summary  Date of Discharge: 04/18/17  Reason for Discharge: All goals achieved  Outcomes Achieved: Able to achieve all goals within established timeline  Discharge Destination: Home with home program      Macey Patel, PT  4/18/2017

## 2017-04-21 ENCOUNTER — APPOINTMENT (OUTPATIENT)
Dept: PHYSICAL THERAPY | Facility: HOSPITAL | Age: 47
End: 2017-04-21

## 2017-04-25 ENCOUNTER — APPOINTMENT (OUTPATIENT)
Dept: PHYSICAL THERAPY | Facility: HOSPITAL | Age: 47
End: 2017-04-25

## 2017-04-28 ENCOUNTER — APPOINTMENT (OUTPATIENT)
Dept: PHYSICAL THERAPY | Facility: HOSPITAL | Age: 47
End: 2017-04-28

## 2017-05-02 ENCOUNTER — APPOINTMENT (OUTPATIENT)
Dept: PHYSICAL THERAPY | Facility: HOSPITAL | Age: 47
End: 2017-05-02

## 2017-05-19 ENCOUNTER — TRANSCRIBE ORDERS (OUTPATIENT)
Dept: ADMINISTRATIVE | Facility: HOSPITAL | Age: 47
End: 2017-05-19

## 2017-05-19 DIAGNOSIS — Z12.31 ENCOUNTER FOR SCREENING MAMMOGRAM FOR MALIGNANT NEOPLASM OF BREAST: Primary | ICD-10-CM

## 2017-05-23 ENCOUNTER — HOSPITAL ENCOUNTER (OUTPATIENT)
Dept: MAMMOGRAPHY | Facility: HOSPITAL | Age: 47
Discharge: HOME | End: 2017-05-23
Admitting: PHYSICIAN ASSISTANT

## 2017-05-23 DIAGNOSIS — Z12.31 ENCOUNTER FOR SCREENING MAMMOGRAM FOR MALIGNANT NEOPLASM OF BREAST: ICD-10-CM

## 2017-05-23 PROCEDURE — G0202 SCR MAMMO BI INCL CAD: HCPCS

## 2017-05-23 PROCEDURE — 77063 BREAST TOMOSYNTHESIS BI: CPT

## 2017-08-03 PROCEDURE — 88305 TISSUE EXAM BY PATHOLOGIST: CPT | Performed by: PHYSICIAN ASSISTANT

## 2017-08-04 ENCOUNTER — LAB REQUISITION (OUTPATIENT)
Dept: LAB | Facility: HOSPITAL | Age: 47
End: 2017-08-04

## 2017-08-04 DIAGNOSIS — Z00.00 ENCOUNTER FOR GENERAL ADULT MEDICAL EXAMINATION WITHOUT ABNORMAL FINDINGS: ICD-10-CM

## 2017-08-07 LAB
CYTO UR: NORMAL
LAB AP CASE REPORT: NORMAL
LAB AP CLINICAL INFORMATION: NORMAL
Lab: NORMAL
PATH REPORT.FINAL DX SPEC: NORMAL
PATH REPORT.GROSS SPEC: NORMAL

## 2017-08-29 ENCOUNTER — TRANSCRIBE ORDERS (OUTPATIENT)
Dept: ADMINISTRATIVE | Facility: HOSPITAL | Age: 47
End: 2017-08-29

## 2017-08-29 DIAGNOSIS — Z00.00 ENCOUNTER FOR GENERAL ADULT MEDICAL EXAMINATION WITHOUT ABNORMAL FINDINGS: Primary | ICD-10-CM

## 2017-08-30 ENCOUNTER — APPOINTMENT (OUTPATIENT)
Dept: LAB | Facility: HOSPITAL | Age: 47
End: 2017-08-30

## 2017-08-30 LAB
ALBUMIN SERPL-MCNC: 4.2 G/DL (ref 3.5–5)
ALBUMIN/GLOB SERPL: 1.5 G/DL (ref 1.1–2.5)
ALP SERPL-CCNC: 120 U/L (ref 24–120)
ALT SERPL W P-5'-P-CCNC: 30 U/L (ref 0–54)
AMORPH URATE CRY URNS QL MICRO: ABNORMAL /HPF
ANION GAP SERPL CALCULATED.3IONS-SCNC: 8 MMOL/L (ref 4–13)
ARTICHOKE IGE QN: 47 MG/DL (ref 0–99)
AST SERPL-CCNC: 23 U/L (ref 7–45)
BACTERIA UR QL AUTO: ABNORMAL /HPF
BASOPHILS # BLD AUTO: 0.01 10*3/MM3 (ref 0–0.2)
BASOPHILS NFR BLD AUTO: 0.2 % (ref 0–2)
BILIRUB SERPL-MCNC: 0.8 MG/DL (ref 0.1–1)
BILIRUB UR QL STRIP: NEGATIVE
BUN BLD-MCNC: 13 MG/DL (ref 5–21)
BUN/CREAT SERPL: 22 (ref 7–25)
CALCIUM SPEC-SCNC: 8.8 MG/DL (ref 8.4–10.4)
CHLORIDE SERPL-SCNC: 104 MMOL/L (ref 98–110)
CHOLEST SERPL-MCNC: 142 MG/DL (ref 130–200)
CLARITY UR: ABNORMAL
CO2 SERPL-SCNC: 29 MMOL/L (ref 24–31)
COLOR UR: YELLOW
CREAT BLD-MCNC: 0.59 MG/DL (ref 0.5–1.4)
DEPRECATED RDW RBC AUTO: 43.7 FL (ref 40–54)
EOSINOPHIL # BLD AUTO: 0.04 10*3/MM3 (ref 0–0.7)
EOSINOPHIL NFR BLD AUTO: 0.8 % (ref 0–4)
ERYTHROCYTE [DISTWIDTH] IN BLOOD BY AUTOMATED COUNT: 13.8 % (ref 12–15)
GFR SERPL CREATININE-BSD FRML MDRD: 111 ML/MIN/1.73
GLOBULIN UR ELPH-MCNC: 2.8 GM/DL
GLUCOSE BLD-MCNC: 80 MG/DL (ref 70–100)
GLUCOSE UR STRIP-MCNC: NEGATIVE MG/DL
HCT VFR BLD AUTO: 37.5 % (ref 37–47)
HDLC SERPL-MCNC: 79 MG/DL
HGB BLD-MCNC: 12.1 G/DL (ref 12–16)
HGB UR QL STRIP.AUTO: NEGATIVE
HYALINE CASTS UR QL AUTO: ABNORMAL /LPF
IMM GRANULOCYTES # BLD: 0.01 10*3/MM3 (ref 0–0.03)
IMM GRANULOCYTES NFR BLD: 0.2 % (ref 0–5)
KETONES UR QL STRIP: NEGATIVE
LDLC/HDLC SERPL: 0.71 {RATIO}
LEUKOCYTE ESTERASE UR QL STRIP.AUTO: ABNORMAL
LYMPHOCYTES # BLD AUTO: 0.84 10*3/MM3 (ref 0.72–4.86)
LYMPHOCYTES NFR BLD AUTO: 17.4 % (ref 15–45)
MCH RBC QN AUTO: 28.1 PG (ref 28–32)
MCHC RBC AUTO-ENTMCNC: 32.3 G/DL (ref 33–36)
MCV RBC AUTO: 87 FL (ref 82–98)
MONOCYTES # BLD AUTO: 0.42 10*3/MM3 (ref 0.19–1.3)
MONOCYTES NFR BLD AUTO: 8.7 % (ref 4–12)
NEUTROPHILS # BLD AUTO: 3.51 10*3/MM3 (ref 1.87–8.4)
NEUTROPHILS NFR BLD AUTO: 72.7 % (ref 39–78)
NITRITE UR QL STRIP: NEGATIVE
PH UR STRIP.AUTO: >=9 [PH] (ref 5–8)
PLATELET # BLD AUTO: 195 10*3/MM3 (ref 130–400)
PMV BLD AUTO: 11.1 FL (ref 6–12)
POTASSIUM BLD-SCNC: 4.4 MMOL/L (ref 3.5–5.3)
PROT SERPL-MCNC: 7 G/DL (ref 6.3–8.7)
PROT UR QL STRIP: NEGATIVE
RBC # BLD AUTO: 4.31 10*6/MM3 (ref 4.2–5.4)
RBC # UR: ABNORMAL /HPF
REF LAB TEST METHOD: ABNORMAL
SODIUM BLD-SCNC: 141 MMOL/L (ref 135–145)
SP GR UR STRIP: 1.02 (ref 1–1.03)
SQUAMOUS #/AREA URNS HPF: ABNORMAL /HPF
T4 FREE SERPL-MCNC: 0.9 NG/DL (ref 0.78–2.19)
TRIGL SERPL-MCNC: 36 MG/DL (ref 0–149)
TSH SERPL DL<=0.05 MIU/L-ACNC: 2.34 MIU/ML (ref 0.47–4.68)
UROBILINOGEN UR QL STRIP: ABNORMAL
WBC NRBC COR # BLD: 4.83 10*3/MM3 (ref 4.8–10.8)
WBC UR QL AUTO: ABNORMAL /HPF

## 2017-08-30 PROCEDURE — 80053 COMPREHEN METABOLIC PANEL: CPT | Performed by: PHYSICIAN ASSISTANT

## 2017-08-30 PROCEDURE — 80061 LIPID PANEL: CPT | Performed by: PHYSICIAN ASSISTANT

## 2017-08-30 PROCEDURE — 84443 ASSAY THYROID STIM HORMONE: CPT | Performed by: PHYSICIAN ASSISTANT

## 2017-08-30 PROCEDURE — 81001 URINALYSIS AUTO W/SCOPE: CPT | Performed by: PHYSICIAN ASSISTANT

## 2017-08-30 PROCEDURE — 85025 COMPLETE CBC W/AUTO DIFF WBC: CPT | Performed by: PHYSICIAN ASSISTANT

## 2017-08-30 PROCEDURE — 36415 COLL VENOUS BLD VENIPUNCTURE: CPT | Performed by: PHYSICIAN ASSISTANT

## 2017-08-30 PROCEDURE — 84439 ASSAY OF FREE THYROXINE: CPT | Performed by: PHYSICIAN ASSISTANT

## 2017-09-13 ENCOUNTER — OFFICE VISIT (OUTPATIENT)
Dept: OBSTETRICS AND GYNECOLOGY | Facility: CLINIC | Age: 47
End: 2017-09-13

## 2017-09-13 VITALS
BODY MASS INDEX: 19.88 KG/M2 | SYSTOLIC BLOOD PRESSURE: 104 MMHG | DIASTOLIC BLOOD PRESSURE: 70 MMHG | HEIGHT: 62 IN | WEIGHT: 108 LBS

## 2017-09-13 DIAGNOSIS — N94.10 DYSPAREUNIA IN FEMALE: ICD-10-CM

## 2017-09-13 DIAGNOSIS — Z78.9 NONSMOKER: ICD-10-CM

## 2017-09-13 DIAGNOSIS — N30.10 IC (INTERSTITIAL CYSTITIS): Primary | ICD-10-CM

## 2017-09-13 PROCEDURE — 99213 OFFICE O/P EST LOW 20 MIN: CPT | Performed by: OBSTETRICS & GYNECOLOGY

## 2017-09-13 RX ORDER — SODIUM OXYBATE 0.5 G/ML
SOLUTION ORAL
COMMUNITY
Start: 2012-10-01

## 2017-09-13 RX ORDER — CHOLECALCIFEROL (VITAMIN D3) 125 MCG
CAPSULE ORAL
COMMUNITY
Start: 2015-07-01

## 2017-09-13 RX ORDER — CHOLECALCIFEROL (VITAMIN D3) 25 MCG
TABLET,CHEWABLE ORAL
COMMUNITY
Start: 2015-01-01

## 2017-09-13 RX ORDER — CHOLECALCIFEROL (VITAMIN D3) 25 MCG
CAPSULE ORAL
COMMUNITY
Start: 2015-01-01

## 2017-09-13 NOTE — PROGRESS NOTES
"Subjective   Chief Complaint   Patient presents with   • Cystitis     pt here today referred by Dr Jain for IC. pt says that she has had burning with urination and painful intercouse. her urine has been tested numerous times and has all come back fine. pt voices no other concerns.      Maribell Whitley is a 46 y.o. year old .  No LMP recorded.  She presents to be seen because of IC.  Patient reports bladder problems right after her daughter was born, but then was ok for many years.  About 8 years ago she started having problems again - she reports frequent UTI symptoms, but no actual infection on UA.  She reports burning with urination.  Patient also c/o painful intercourse with deep penetration \"that feels like he is hitting something\" - this pain is somewhat positional, not eliminated but better if she is on top.    The patient has previously taken several different medications that she believes were for treatment of IC, but she is certain that she has not taken Elmiron.  She thinks they were all OAB medications.    The following portions of the patient's history were reviewed and updated as appropriate:current medications and allergies    Smoking status: Never Smoker                                                                 Smokeless status: Not on file                       Review of Systems   Constitutional: Negative for fatigue and unexpected weight change.   Respiratory: Negative for shortness of breath.    Cardiovascular: Negative for chest pain.   Gastrointestinal: Negative for constipation and diarrhea.   Genitourinary: Positive for dyspareunia, frequency and urgency (mostly bladder spasms).   Musculoskeletal: Negative for arthralgias and back pain.   Neurological: Negative for dizziness.         Objective   /70  Ht 62\" (157.5 cm)  Wt 108 lb (49 kg)  BMI 19.75 kg/m2    Physical Exam   Constitutional: She is oriented to person, place, and time. She appears well-developed and " "well-nourished. No distress.   HENT:   Head: Normocephalic and atraumatic.   Eyes: EOM are normal.   Neck: Normal range of motion. No thyromegaly present.   Cardiovascular:   No murmur heard.  Pulmonary/Chest: Effort normal. She has no wheezes.   Abdominal: Soft. She exhibits no distension. There is no tenderness.   Musculoskeletal: Normal range of motion.   Neurological: She is alert and oriented to person, place, and time.   Skin: Skin is warm and dry.   Psychiatric: She has a normal mood and affect. Her behavior is normal. Judgment normal.   Nursing note and vitals reviewed.      Lab Review   No data reviewed    Imaging   No data reviewed     Assessment & Plan  Maribell was seen today for cystitis.    Diagnoses and all orders for this visit:    IC (interstitial cystitis): Patient understands that IC is a diagnosis of exclusion and that treatment itself will be a test.  The cause of IC was explained, and the patient was offered oral elmiron alone, or elmiron po with the addition of bladder instillations in 2 weeks.  The patient wants to be as aggressive as possible to make the most of the \"test\".  She was also advised to avoid dietary bladder irritants like EtOH, citrus fruits and caffeine.  RTO for first instillation in 2 weeks.  -     pentosan polysulfate (ELMIRON) 100 MG capsule; Take 1 capsule by mouth 4 (Four) Times a Day.    Dyspareunia in female:  The patient does not describe the typical bladder spasms with intercourse, or starting immediately afterward, that are reported by many IC patients.  Because her dyspareunia is described as a feeling that her partner is \"hitting something\" only with deep thrusting, and because it is somewhat relieved by position changes, this could be related to uterine prolapse.  She understands that this is likely a separate issue.    Nonsmoker    Body mass index (BMI) less than or equal to 19 in adult        This note was electronically signed.    Toya Craig MD  September 13, " 2017  8:35 AM    Total time spent today with Maribell  was 35 minutes (level 3).  Greater than 50% of the time was spent coordinating care, answering her questions and counseling regarding pathophysiology of her presenting problem along with plans for any diagnositc work-up and treatment.

## 2017-09-29 ENCOUNTER — OFFICE VISIT (OUTPATIENT)
Dept: OBSTETRICS AND GYNECOLOGY | Facility: CLINIC | Age: 47
End: 2017-09-29

## 2017-09-29 VITALS
BODY MASS INDEX: 20.06 KG/M2 | HEIGHT: 62 IN | DIASTOLIC BLOOD PRESSURE: 72 MMHG | SYSTOLIC BLOOD PRESSURE: 98 MMHG | WEIGHT: 109 LBS

## 2017-09-29 DIAGNOSIS — N30.10 INTERSTITIAL CYSTITIS: Primary | ICD-10-CM

## 2017-09-29 PROCEDURE — 51700 IRRIGATION OF BLADDER: CPT | Performed by: NURSE PRACTITIONER

## 2017-09-29 PROCEDURE — 99213 OFFICE O/P EST LOW 20 MIN: CPT | Performed by: NURSE PRACTITIONER

## 2017-09-29 PROCEDURE — A4351 STRAIGHT TIP URINE CATHETER: HCPCS | Performed by: NURSE PRACTITIONER

## 2017-09-29 PROCEDURE — 80176 ASSAY OF LIDOCAINE: CPT | Performed by: NURSE PRACTITIONER

## 2017-09-29 NOTE — PROGRESS NOTES
Pt's Urethra is cleansed with Betadine, 14 [place] Catheter is inserted without difficulty. Bladder is emptied of urine.  IC cocktail made up of 1% Xylocaine, Sodium Bicarb, normal saline and Elmiron powder is inserted into the Catheter and Pt is encouraged to hold as long as possible.

## 2017-09-29 NOTE — PROGRESS NOTES
Subjective   Maribell Whitley is a 46 y.o. female.     History of Present Illness    The following portions of the patient's history were reviewed and updated as appropriate: allergies, current medications, past family history, past medical history, past social history, past surgical history and problem list.    Review of Systems   Constitutional: Negative for activity change, appetite change, chills, diaphoresis, fatigue, fever and unexpected weight change.   HENT: Negative for congestion, ear discharge, ear pain, facial swelling, hearing loss, mouth sores, nosebleeds, postnasal drip, rhinorrhea, sinus pressure, sneezing, sore throat, tinnitus, trouble swallowing and voice change.    Eyes: Negative for photophobia, pain, discharge, redness, itching and visual disturbance.   Respiratory: Negative for apnea, cough, choking, chest tightness and shortness of breath.    Cardiovascular: Negative for chest pain, palpitations and leg swelling.   Gastrointestinal: Negative for abdominal distention, abdominal pain, anal bleeding, blood in stool, constipation, diarrhea, nausea, rectal pain and vomiting.   Endocrine: Negative for cold intolerance and heat intolerance.   Genitourinary: Positive for frequency and urgency. Negative for decreased urine volume, difficulty urinating, dyspareunia, flank pain, genital sores, hematuria, menstrual problem, pelvic pain, vaginal bleeding, vaginal discharge and vaginal pain.        Bladder pain   Musculoskeletal: Negative for arthralgias, back pain, joint swelling and myalgias.   Skin: Negative for color change and rash.   Allergic/Immunologic: Negative for environmental allergies.   Neurological: Negative for dizziness, syncope, weakness, numbness and headaches.   Hematological: Negative for adenopathy.   Psychiatric/Behavioral: Negative for agitation, confusion and sleep disturbance. The patient is not nervous/anxious.        Objective   Physical Exam   Constitutional: She is oriented to  person, place, and time. She appears well-developed and well-nourished.   Cardiovascular: Normal rate and regular rhythm.    Pulmonary/Chest: Effort normal and breath sounds normal.   Genitourinary: Pelvic exam was performed with patient supine.   Neurological: She is alert and oriented to person, place, and time.   Psychiatric: She has a normal mood and affect. Her behavior is normal.   Nursing note and vitals reviewed.      Assessment/Plan   Maribell was seen today for intersticial cystitus.    Diagnoses and all orders for this visit:    Interstitial cystitis  Comments:  Pt's Urethra is cleansed with Betadine, 14 [place] Catheter is inserted without difficulty. Bladder is emptied of urine.  IC cocktail made up of 1% Xylocaine, Sodium Bicarb, normal saline and Elmiron powder is inserted into the Catheter.  Pt is encouraged to hold as long as possible.  RTO Friday for next treatment.

## 2017-10-03 ENCOUNTER — OFFICE VISIT (OUTPATIENT)
Dept: OBSTETRICS AND GYNECOLOGY | Facility: CLINIC | Age: 47
End: 2017-10-03

## 2017-10-03 VITALS
DIASTOLIC BLOOD PRESSURE: 68 MMHG | WEIGHT: 109 LBS | HEIGHT: 62 IN | SYSTOLIC BLOOD PRESSURE: 102 MMHG | BODY MASS INDEX: 20.06 KG/M2

## 2017-10-03 DIAGNOSIS — N30.10 INTERSTITIAL CYSTITIS: Primary | ICD-10-CM

## 2017-10-03 PROCEDURE — 51700 IRRIGATION OF BLADDER: CPT | Performed by: NURSE PRACTITIONER

## 2017-10-03 PROCEDURE — 80176 ASSAY OF LIDOCAINE: CPT | Performed by: NURSE PRACTITIONER

## 2017-10-03 PROCEDURE — 99213 OFFICE O/P EST LOW 20 MIN: CPT | Performed by: NURSE PRACTITIONER

## 2017-10-03 PROCEDURE — A4351 STRAIGHT TIP URINE CATHETER: HCPCS | Performed by: NURSE PRACTITIONER

## 2017-10-03 NOTE — PROGRESS NOTES
Subjective   Maribell Whitley is a 46 y.o. female.     History of Present Illness    The following portions of the patient's history were reviewed and updated as appropriate: allergies, current medications, past family history, past medical history, past social history, past surgical history and problem list.    Review of Systems   Constitutional: Negative for activity change, appetite change, chills, diaphoresis, fatigue, fever and unexpected weight change.   HENT: Negative for congestion, ear discharge, ear pain, facial swelling, hearing loss, mouth sores, nosebleeds, postnasal drip, rhinorrhea, sinus pressure, sneezing, sore throat, tinnitus, trouble swallowing and voice change.    Eyes: Negative for photophobia, pain, discharge, redness, itching and visual disturbance.   Respiratory: Negative for apnea, cough, choking, chest tightness and shortness of breath.    Cardiovascular: Negative for chest pain, palpitations and leg swelling.   Gastrointestinal: Negative for abdominal distention, abdominal pain, anal bleeding, blood in stool, constipation, diarrhea, nausea, rectal pain and vomiting.   Endocrine: Negative for cold intolerance and heat intolerance.   Genitourinary: Positive for frequency and urgency. Negative for decreased urine volume, difficulty urinating, dyspareunia, flank pain, genital sores, hematuria, menstrual problem, pelvic pain, vaginal bleeding, vaginal discharge and vaginal pain.        Bladder pain     Musculoskeletal: Negative for arthralgias, back pain, joint swelling and myalgias.   Skin: Negative for color change and rash.   Allergic/Immunologic: Negative for environmental allergies.   Neurological: Negative for dizziness, syncope, weakness, numbness and headaches.   Hematological: Negative for adenopathy.   Psychiatric/Behavioral: Negative for agitation, confusion and sleep disturbance. The patient is not nervous/anxious.        Objective   Physical Exam   Constitutional: She is oriented to  person, place, and time. She appears well-developed and well-nourished.   Cardiovascular: Normal rate and regular rhythm.    Pulmonary/Chest: Effort normal and breath sounds normal.   Neurological: She is alert and oriented to person, place, and time.   Psychiatric: She has a normal mood and affect. Her behavior is normal.   Nursing note and vitals reviewed.      Assessment/Plan   Maribell was seen today for interstitial cystiis.    Diagnoses and all orders for this visit:    Interstitial cystitis  Comments:        Pt's Urethra is cleansed with betadine, 14 [place] catheter is inserted without difficulty. Bladder is emptied of urine.  IC cocktail made up of 1% Xylocaine, Sodium bicarb, normal             saline and Elmiron powder is inserted into the catheter and pt is encouraged to hold as long as possible.

## 2017-10-06 ENCOUNTER — OFFICE VISIT (OUTPATIENT)
Dept: OBSTETRICS AND GYNECOLOGY | Facility: CLINIC | Age: 47
End: 2017-10-06

## 2017-10-06 VITALS
DIASTOLIC BLOOD PRESSURE: 72 MMHG | HEIGHT: 62 IN | BODY MASS INDEX: 20.06 KG/M2 | WEIGHT: 109 LBS | SYSTOLIC BLOOD PRESSURE: 108 MMHG

## 2017-10-06 DIAGNOSIS — N30.10 IC (INTERSTITIAL CYSTITIS): Primary | ICD-10-CM

## 2017-10-06 PROCEDURE — A4351 STRAIGHT TIP URINE CATHETER: HCPCS | Performed by: NURSE PRACTITIONER

## 2017-10-06 PROCEDURE — 51700 IRRIGATION OF BLADDER: CPT | Performed by: NURSE PRACTITIONER

## 2017-10-06 PROCEDURE — 80176 ASSAY OF LIDOCAINE: CPT | Performed by: NURSE PRACTITIONER

## 2017-10-06 PROCEDURE — 99213 OFFICE O/P EST LOW 20 MIN: CPT | Performed by: NURSE PRACTITIONER

## 2017-10-06 NOTE — PROGRESS NOTES
Subjective   Maribell Whitley is a 46 y.o. female.     History of Present Illness    The following portions of the patient's history were reviewed and updated as appropriate: allergies, current medications, past family history, past medical history, past social history, past surgical history and problem list.    Review of Systems   Constitutional: Negative for activity change, appetite change, chills, diaphoresis, fatigue, fever and unexpected weight change.   HENT: Negative for congestion, ear discharge, ear pain, facial swelling, hearing loss, mouth sores, nosebleeds, postnasal drip, rhinorrhea, sinus pressure, sneezing, sore throat, tinnitus, trouble swallowing and voice change.    Eyes: Negative for photophobia, pain, discharge, redness, itching and visual disturbance.   Respiratory: Negative for apnea, cough, choking, chest tightness and shortness of breath.    Cardiovascular: Negative for chest pain, palpitations and leg swelling.   Gastrointestinal: Negative for abdominal distention, abdominal pain, anal bleeding, blood in stool, constipation, diarrhea, nausea, rectal pain and vomiting.   Endocrine: Negative for cold intolerance and heat intolerance.   Genitourinary: Positive for frequency and urgency. Negative for decreased urine volume, difficulty urinating, dyspareunia, flank pain, genital sores, hematuria, menstrual problem, pelvic pain, vaginal bleeding, vaginal discharge and vaginal pain.        Bladder pain   Musculoskeletal: Negative for arthralgias, back pain, joint swelling and myalgias.   Skin: Negative for color change and rash.   Allergic/Immunologic: Negative for environmental allergies.   Neurological: Negative for dizziness, syncope, weakness, numbness and headaches.   Hematological: Negative for adenopathy.   Psychiatric/Behavioral: Negative for agitation, confusion and sleep disturbance. The patient is not nervous/anxious.        Objective   Physical Exam   Constitutional: She is oriented to  person, place, and time. She appears well-developed and well-nourished.   Cardiovascular: Normal rate and regular rhythm.    Pulmonary/Chest: Effort normal and breath sounds normal.   Neurological: She is alert and oriented to person, place, and time.   Psychiatric: She has a normal mood and affect. Her behavior is normal.   Nursing note and vitals reviewed.      Assessment/Plan   Maribell was seen today for intersticial cystitis.    Diagnoses and all orders for this visit:    IC (interstitial cystitis)  Comments:        Pt's Urethra is cleansed with Betadine, 14 [place] Catheter is inserted without difficulty. Bladder is emptied of urine.  IC cocktail made up of 1% Xylocaine, Sodium Bicarb, normal                saline and Elmiron powder is inserted into the Catheter and Pt is encouraged to hold as long as possible.  RTO Tuesday for next treatment or sooner prn.

## 2017-10-10 ENCOUNTER — OFFICE VISIT (OUTPATIENT)
Dept: OBSTETRICS AND GYNECOLOGY | Facility: CLINIC | Age: 47
End: 2017-10-10

## 2017-10-10 VITALS
HEIGHT: 62 IN | WEIGHT: 109 LBS | DIASTOLIC BLOOD PRESSURE: 74 MMHG | BODY MASS INDEX: 20.06 KG/M2 | SYSTOLIC BLOOD PRESSURE: 104 MMHG

## 2017-10-10 DIAGNOSIS — N30.10 IC (INTERSTITIAL CYSTITIS): Primary | ICD-10-CM

## 2017-10-10 PROCEDURE — 99213 OFFICE O/P EST LOW 20 MIN: CPT | Performed by: NURSE PRACTITIONER

## 2017-10-13 ENCOUNTER — OFFICE VISIT (OUTPATIENT)
Dept: OBSTETRICS AND GYNECOLOGY | Facility: CLINIC | Age: 47
End: 2017-10-13

## 2017-10-13 VITALS
HEIGHT: 62 IN | SYSTOLIC BLOOD PRESSURE: 106 MMHG | DIASTOLIC BLOOD PRESSURE: 74 MMHG | WEIGHT: 108 LBS | BODY MASS INDEX: 19.88 KG/M2

## 2017-10-13 DIAGNOSIS — N30.10 IC (INTERSTITIAL CYSTITIS): Primary | ICD-10-CM

## 2017-10-13 PROCEDURE — 99213 OFFICE O/P EST LOW 20 MIN: CPT | Performed by: NURSE PRACTITIONER

## 2017-10-13 PROCEDURE — 51700 IRRIGATION OF BLADDER: CPT | Performed by: NURSE PRACTITIONER

## 2017-10-13 PROCEDURE — 80176 ASSAY OF LIDOCAINE: CPT | Performed by: NURSE PRACTITIONER

## 2017-10-13 NOTE — PROGRESS NOTES
Subjective   Maribell Whitley is a 46 y.o. female.     History of Present Illness    The following portions of the patient's history were reviewed and updated as appropriate: allergies, current medications, past family history, past medical history, past social history, past surgical history and problem list.    Review of Systems   Constitutional: Negative for activity change, appetite change, chills, diaphoresis, fatigue, fever and unexpected weight change.   HENT: Negative for congestion, ear discharge, ear pain, facial swelling, hearing loss, mouth sores, nosebleeds, postnasal drip, rhinorrhea, sinus pressure, sneezing, sore throat, tinnitus, trouble swallowing and voice change.    Eyes: Negative for photophobia, pain, discharge, redness, itching and visual disturbance.   Respiratory: Negative for apnea, cough, choking, chest tightness and shortness of breath.    Cardiovascular: Negative for chest pain, palpitations and leg swelling.   Gastrointestinal: Negative for abdominal distention, abdominal pain, anal bleeding, blood in stool, constipation, diarrhea, nausea, rectal pain and vomiting.   Endocrine: Negative for cold intolerance and heat intolerance.   Genitourinary: Negative for decreased urine volume, difficulty urinating, dyspareunia, flank pain, frequency, genital sores, hematuria, menstrual problem, pelvic pain, urgency, vaginal bleeding, vaginal discharge and vaginal pain.   Musculoskeletal: Negative for arthralgias, back pain, joint swelling and myalgias.   Skin: Negative for color change and rash.   Allergic/Immunologic: Negative for environmental allergies.   Neurological: Negative for dizziness, syncope, weakness, numbness and headaches.   Hematological: Negative for adenopathy.   Psychiatric/Behavioral: Negative for agitation, confusion and sleep disturbance. The patient is not nervous/anxious.        Objective   Physical Exam   Constitutional: She is oriented to person, place, and time. She appears  well-developed and well-nourished.   Cardiovascular: Normal rate and regular rhythm.    Pulmonary/Chest: Effort normal and breath sounds normal.   Neurological: She is alert and oriented to person, place, and time.   Psychiatric: She has a normal mood and affect. Her behavior is normal.   Nursing note and vitals reviewed.      Assessment/Plan   Maribell was seen today for urinary urgency.    Diagnoses and all orders for this visit:    IC (interstitial cystitis)  Comments:        Pt reports a 30-40% improvement in symptoms since beginning IC treatments.  Pt's Urethra is cleansed with Betadine, 14 [place] citizen Catheter is inserted without difficulty. Bladder is emptied of urine.  IC cocktail made up of 1% Xylocaine, Sodium Bicarb, normal       saline and Elmiron powder is inserted into the Catheter and Pt is encouraged to hold as long as possible.

## 2017-10-13 NOTE — PROGRESS NOTES
Subjective   Maribell Whitley is a 46 y.o. female.     History of Present Illness    The following portions of the patient's history were reviewed and updated as appropriate: allergies, current medications, past family history, past medical history, past social history, past surgical history and problem list.    Review of Systems   Constitutional: Negative for activity change, appetite change, chills, diaphoresis, fatigue, fever and unexpected weight change.   HENT: Negative for congestion, ear discharge, ear pain, facial swelling, hearing loss, mouth sores, nosebleeds, postnasal drip, rhinorrhea, sinus pressure, sneezing, sore throat, tinnitus, trouble swallowing and voice change.    Eyes: Negative for photophobia, pain, discharge, redness, itching and visual disturbance.   Respiratory: Negative for apnea, cough, choking, chest tightness and shortness of breath.    Cardiovascular: Negative for chest pain, palpitations and leg swelling.   Gastrointestinal: Negative for abdominal distention, abdominal pain, anal bleeding, blood in stool, constipation, diarrhea, nausea, rectal pain and vomiting.   Endocrine: Negative for cold intolerance and heat intolerance.   Genitourinary: Negative for decreased urine volume, difficulty urinating, dyspareunia, flank pain, frequency, genital sores, hematuria, menstrual problem, pelvic pain, urgency, vaginal bleeding, vaginal discharge and vaginal pain.        Bladder pain   Musculoskeletal: Negative for arthralgias, back pain, joint swelling and myalgias.   Skin: Negative for color change and rash.   Allergic/Immunologic: Negative for environmental allergies.   Neurological: Negative for dizziness, syncope, weakness, numbness and headaches.   Hematological: Negative for adenopathy.   Psychiatric/Behavioral: Negative for agitation, confusion and sleep disturbance. The patient is not nervous/anxious.        Objective   Physical Exam   Constitutional: She is oriented to person, place, and  time. She appears well-developed and well-nourished.   Cardiovascular: Normal rate and regular rhythm.    Pulmonary/Chest: Effort normal and breath sounds normal.   Neurological: She is alert and oriented to person, place, and time.   Psychiatric: She has a normal mood and affect. Her behavior is normal.   Nursing note and vitals reviewed.  Pt's Urethra is cleansed with Betadine, 14 Surinamese Catheter is inserted without difficulty. Bladder is emptied of urine.  IC cocktail made up of 1% Xylocaine, Sodium Bicarb, normal saline and Elmiron powder is inserted into the Catheter and Pt is encouraged to hold as long as possible.    Assessment/Plan   Maribell was seen today for intersticial cystitis.    Diagnoses and all orders for this visit:    IC (interstitial cystitis)  Pt's Urethra is cleansed with Betadine, 14 Surinamese Catheter is inserted without difficulty. Bladder is emptied of urine.  IC cocktail made up of 1% Xylocaine, Sodium Bicarb, normal saline and Elmiron powder is inserted into the Catheter and Pt is encouraged to hold as long as possible.

## 2017-10-17 ENCOUNTER — OFFICE VISIT (OUTPATIENT)
Dept: OBSTETRICS AND GYNECOLOGY | Facility: CLINIC | Age: 47
End: 2017-10-17

## 2017-10-17 VITALS
BODY MASS INDEX: 19.88 KG/M2 | DIASTOLIC BLOOD PRESSURE: 74 MMHG | WEIGHT: 108 LBS | SYSTOLIC BLOOD PRESSURE: 108 MMHG | HEIGHT: 62 IN

## 2017-10-17 DIAGNOSIS — N30.10 IC (INTERSTITIAL CYSTITIS): Primary | ICD-10-CM

## 2017-10-17 PROCEDURE — 99213 OFFICE O/P EST LOW 20 MIN: CPT | Performed by: NURSE PRACTITIONER

## 2017-10-17 PROCEDURE — 51700 IRRIGATION OF BLADDER: CPT | Performed by: NURSE PRACTITIONER

## 2017-10-17 PROCEDURE — A4351 STRAIGHT TIP URINE CATHETER: HCPCS | Performed by: NURSE PRACTITIONER

## 2017-10-17 PROCEDURE — 80176 ASSAY OF LIDOCAINE: CPT | Performed by: NURSE PRACTITIONER

## 2017-10-17 NOTE — PROGRESS NOTES
Subjective   Maribell Whitley is a 46 y.o. female.     History of Present Illness    The following portions of the patient's history were reviewed and updated as appropriate: allergies, current medications, past family history, past medical history, past social history, past surgical history and problem list.    Review of Systems   Constitutional: Negative for activity change, appetite change, chills, diaphoresis, fatigue, fever and unexpected weight change.   HENT: Negative for congestion, ear discharge, ear pain, facial swelling, hearing loss, mouth sores, nosebleeds, postnasal drip, rhinorrhea, sinus pressure, sneezing, sore throat, tinnitus, trouble swallowing and voice change.    Eyes: Negative for photophobia, pain, discharge, redness, itching and visual disturbance.   Respiratory: Negative for apnea, cough, choking, chest tightness and shortness of breath.    Cardiovascular: Negative for chest pain, palpitations and leg swelling.   Gastrointestinal: Negative for abdominal distention, abdominal pain, anal bleeding, blood in stool, constipation, diarrhea, nausea, rectal pain and vomiting.   Endocrine: Negative for cold intolerance and heat intolerance.   Genitourinary: Negative for decreased urine volume, difficulty urinating, dyspareunia, flank pain, frequency, genital sores, hematuria, menstrual problem, pelvic pain, urgency, vaginal bleeding, vaginal discharge and vaginal pain.   Musculoskeletal: Negative for arthralgias, back pain, joint swelling and myalgias.   Skin: Negative for color change and rash.   Allergic/Immunologic: Negative for environmental allergies.   Neurological: Negative for dizziness, syncope, weakness, numbness and headaches.   Hematological: Negative for adenopathy.   Psychiatric/Behavioral: Negative for agitation, confusion and sleep disturbance. The patient is not nervous/anxious.        Objective   Physical Exam   Constitutional: She is oriented to person, place, and time. She appears  well-developed and well-nourished.   Cardiovascular: Normal rate and regular rhythm.    Pulmonary/Chest: Effort normal and breath sounds normal.   Genitourinary: Pelvic exam was performed with patient supine.   Neurological: She is alert and oriented to person, place, and time.   Psychiatric: She has a normal mood and affect. Her behavior is normal.   Nursing note and vitals reviewed.      Assessment/Plan   Maribell was seen today for intersticial cystitis.    Diagnoses and all orders for this visit:    IC (interstitial cystitis)   Patient reports continuing improvement in reducing symptoms of IC..Pt's Urethra is cleansed with Betadine, 14 Yoruba Catheter is inserted without difficulty. Bladder is emptied of urine.  IC cocktail made up of 1% Xylocaine, Sodium Bicarb, normal saline and Elmiron powder is inserted into the Catheter and Pt is encouraged to hold as long as possible.

## 2017-10-20 ENCOUNTER — OFFICE VISIT (OUTPATIENT)
Dept: OBSTETRICS AND GYNECOLOGY | Facility: CLINIC | Age: 47
End: 2017-10-20

## 2017-10-20 VITALS
WEIGHT: 108 LBS | DIASTOLIC BLOOD PRESSURE: 72 MMHG | BODY MASS INDEX: 19.88 KG/M2 | HEIGHT: 62 IN | SYSTOLIC BLOOD PRESSURE: 106 MMHG

## 2017-10-20 DIAGNOSIS — N30.10 IC (INTERSTITIAL CYSTITIS): Primary | ICD-10-CM

## 2017-10-20 PROCEDURE — A4351 STRAIGHT TIP URINE CATHETER: HCPCS | Performed by: NURSE PRACTITIONER

## 2017-10-20 PROCEDURE — 80176 ASSAY OF LIDOCAINE: CPT | Performed by: NURSE PRACTITIONER

## 2017-10-20 PROCEDURE — 51700 IRRIGATION OF BLADDER: CPT | Performed by: NURSE PRACTITIONER

## 2017-10-20 PROCEDURE — 99213 OFFICE O/P EST LOW 20 MIN: CPT | Performed by: NURSE PRACTITIONER

## 2017-10-20 NOTE — PROGRESS NOTES
Procedure   Procedures    Pt's Urethra is cleansed with Betadine, 14 Mont[place]erratian Catheter is inserted without difficulty. Bladder is emptied of urine.  IC cocktail made up of 1% Xylocaine, Sodium Bicarb, normal saline and Elmiron powder is inserted into the Catheter and Pt is encouraged to hold as long as possible.

## 2017-10-24 ENCOUNTER — OFFICE VISIT (OUTPATIENT)
Dept: OBSTETRICS AND GYNECOLOGY | Facility: CLINIC | Age: 47
End: 2017-10-24

## 2017-10-24 VITALS
BODY MASS INDEX: 19.88 KG/M2 | DIASTOLIC BLOOD PRESSURE: 74 MMHG | SYSTOLIC BLOOD PRESSURE: 112 MMHG | HEIGHT: 62 IN | WEIGHT: 108 LBS

## 2017-10-24 DIAGNOSIS — N30.10 INTERSTITIAL CYSTITIS: Primary | ICD-10-CM

## 2017-10-24 PROCEDURE — 80176 ASSAY OF LIDOCAINE: CPT | Performed by: NURSE PRACTITIONER

## 2017-10-24 PROCEDURE — 99213 OFFICE O/P EST LOW 20 MIN: CPT | Performed by: NURSE PRACTITIONER

## 2017-10-24 PROCEDURE — 51700 IRRIGATION OF BLADDER: CPT | Performed by: NURSE PRACTITIONER

## 2017-10-24 PROCEDURE — A4351 STRAIGHT TIP URINE CATHETER: HCPCS | Performed by: NURSE PRACTITIONER

## 2017-10-24 NOTE — PROGRESS NOTES
"Subjective   Maribell Whitley is a 46 y.o. female     HPI Comments: Maribell Whitley is here today for IC therapy. This is her 8 of 12 treatments. She states she has noticed improvement.            /74 (BP Location: Left arm, Patient Position: Sitting, Cuff Size: Adult)  Ht 62\" (157.5 cm)  Wt 108 lb (49 kg)  BMI 19.75 kg/m2      Outpatient Encounter Prescriptions as of 10/24/2017   Medication Sig Dispense Refill   • Calcium Carb-Cholecalciferol (CALCIUM 500 + D3 PO)      • Cholecalciferol (VITAMIN D-3) 1000 units capsule      • Coenzyme Q-10 100 MG capsule      • Cyanocobalamin (B-12) 1000 MCG capsule      • pentosan polysulfate (ELMIRON) 100 MG capsule Take 1 capsule by mouth 4 (Four) Times a Day. 120 capsule 2   • Sodium Oxybate (XYREM) 500 MG/ML solution        No facility-administered encounter medications on file as of 10/24/2017.            Surgical History  Past Surgical History:   Procedure Laterality Date   • AUGMENTATION MAMMAPLASTY  2012   • BREAST BIOPSY Left 2011   • CHOLECYSTECTOMY  2016   • GASTRIC BYPASS  2005   • HYSTERECTOMY     • REDUCTION MAMMAPLASTY  2001         Family History  Family History   Problem Relation Age of Onset   • Breast cancer Maternal Grandmother    • Ovarian cancer Neg Hx    • Colon cancer Neg Hx              The following portions of the patient's history were reviewed and updated as appropriate: allergies, current medications, past family history, past medical history, past social history, past surgical history and problem list.    Review of Systems   Constitutional: Negative for activity change, appetite change, chills, diaphoresis, fatigue, fever and unexpected weight change.   HENT: Negative for congestion, ear discharge, ear pain, facial swelling, hearing loss, mouth sores, nosebleeds, postnasal drip, rhinorrhea, sinus pressure, sneezing, sore throat, tinnitus, trouble swallowing and voice change.    Eyes: Negative for photophobia, pain, discharge, redness, itching and " visual disturbance.   Respiratory: Negative for apnea, cough, choking, chest tightness and shortness of breath.    Cardiovascular: Negative for chest pain, palpitations and leg swelling.   Gastrointestinal: Negative for abdominal distention, abdominal pain, anal bleeding, blood in stool, constipation, diarrhea, nausea, rectal pain and vomiting.   Endocrine: Negative for cold intolerance and heat intolerance.   Genitourinary: Negative for decreased urine volume, difficulty urinating, dyspareunia, flank pain, frequency, genital sores, hematuria, menstrual problem, pelvic pain, urgency, vaginal bleeding, vaginal discharge and vaginal pain.   Musculoskeletal: Negative for arthralgias, back pain, joint swelling and myalgias.   Skin: Negative for color change and rash.   Allergic/Immunologic: Negative for environmental allergies.   Neurological: Negative for dizziness, syncope, weakness, numbness and headaches.   Hematological: Negative for adenopathy.   Psychiatric/Behavioral: Negative for agitation, confusion and sleep disturbance. The patient is not nervous/anxious.              Objective   Physical Exam   Constitutional: She is oriented to person, place, and time. She appears well-developed and well-nourished.   HENT:   Head: Normocephalic and atraumatic.   Abdominal: Soft.   Genitourinary: Vagina normal. There is no rash, tenderness, lesion or injury on the right labia. There is no rash, tenderness, lesion or injury on the left labia. No vaginal discharge found.   Neurological: She is alert and oriented to person, place, and time.   Skin: Skin is warm and dry.   Psychiatric: She has a normal mood and affect. Her behavior is normal. Judgment and thought content normal.   Nursing note and vitals reviewed.        Assessment/Plan   Maribell was seen today for intersticial cystitis.    Diagnoses and all orders for this visit:    Interstitial cystitis  Comments:  See Procedure note.

## 2017-10-24 NOTE — PROGRESS NOTES
Procedure   Procedures    Pt's Urethra is cleansed with Betadine, 14 Barbadian Catheter is inserted without difficulty. Bladder is emptied of urine.  IC cocktail made up of 1% Xylocaine, Sodium Bicarb, normal saline and Elmiron powder is inserted into the Catheter and Pt is encouraged to hold as long as possible.

## 2017-10-27 ENCOUNTER — OFFICE VISIT (OUTPATIENT)
Dept: OBSTETRICS AND GYNECOLOGY | Facility: CLINIC | Age: 47
End: 2017-10-27

## 2017-10-27 VITALS
HEIGHT: 62 IN | DIASTOLIC BLOOD PRESSURE: 78 MMHG | BODY MASS INDEX: 19.88 KG/M2 | SYSTOLIC BLOOD PRESSURE: 110 MMHG | WEIGHT: 108 LBS

## 2017-10-27 DIAGNOSIS — N30.10 IC (INTERSTITIAL CYSTITIS): Primary | ICD-10-CM

## 2017-10-27 PROCEDURE — 99213 OFFICE O/P EST LOW 20 MIN: CPT | Performed by: NURSE PRACTITIONER

## 2017-10-27 NOTE — PROGRESS NOTES
Pt's Urethra is cleansed with Betadine, 14 [place] Sudanese Catheter is inserted without difficulty. Bladder is emptied of urine.  IC cocktail made up of 1% Xylocaine, Sodium Bicarb, normal saline and Elmiron powder is inserted into the Catheter and Pt is encouraged to hold as long as possible.

## 2017-10-27 NOTE — PROGRESS NOTES
Subjective   Maribell Whitley is a 46 y.o. female.     History of Present Illness    The following portions of the patient's history were reviewed and updated as appropriate: allergies, current medications, past family history, past medical history, past social history, past surgical history and problem list.    Review of Systems   Constitutional: Negative for activity change, appetite change, chills, diaphoresis, fatigue, fever and unexpected weight change.   HENT: Negative for congestion, ear discharge, ear pain, facial swelling, hearing loss, mouth sores, nosebleeds, postnasal drip, rhinorrhea, sinus pressure, sneezing, sore throat, tinnitus, trouble swallowing and voice change.    Eyes: Negative for photophobia, pain, discharge, redness, itching and visual disturbance.   Respiratory: Negative for apnea, cough, choking, chest tightness and shortness of breath.    Cardiovascular: Negative for chest pain, palpitations and leg swelling.   Gastrointestinal: Negative for abdominal distention, abdominal pain, anal bleeding, blood in stool, constipation, diarrhea, nausea, rectal pain and vomiting.   Endocrine: Negative for cold intolerance and heat intolerance.   Genitourinary: Negative for decreased urine volume, difficulty urinating, dyspareunia, flank pain, frequency, genital sores, hematuria, menstrual problem, pelvic pain, urgency, vaginal bleeding, vaginal discharge and vaginal pain.   Musculoskeletal: Negative for arthralgias, back pain, joint swelling and myalgias.   Skin: Negative for color change and rash.   Allergic/Immunologic: Negative for environmental allergies.   Neurological: Negative for dizziness, syncope, weakness, numbness and headaches.   Hematological: Negative for adenopathy.   Psychiatric/Behavioral: Negative for agitation, confusion and sleep disturbance. The patient is not nervous/anxious.        Objective   Physical Exam   Constitutional: She is oriented to person, place, and time. She appears  "well-developed and well-nourished.   Cardiovascular: Normal rate and regular rhythm.    Pulmonary/Chest: Effort normal and breath sounds normal.   Neurological: She is alert and oriented to person, place, and time.   Psychiatric: She has a normal mood and affect. Her behavior is normal.   Nursing note and vitals reviewed.      Assessment/Plan   Maribell was seen today for intersticial cystitis.    Diagnoses and all orders for this visit:    IC (interstitial cystitis)  Comments:  Patient reports she feels \"normal\" now since starting twice/weekly IC treatments.  Will RTO in 4 days for follow up treatment.                  "

## 2017-10-31 ENCOUNTER — OFFICE VISIT (OUTPATIENT)
Dept: OBSTETRICS AND GYNECOLOGY | Facility: CLINIC | Age: 47
End: 2017-10-31

## 2017-10-31 VITALS
WEIGHT: 108 LBS | SYSTOLIC BLOOD PRESSURE: 122 MMHG | BODY MASS INDEX: 19.88 KG/M2 | DIASTOLIC BLOOD PRESSURE: 82 MMHG | HEIGHT: 62 IN

## 2017-10-31 DIAGNOSIS — N30.10 IC (INTERSTITIAL CYSTITIS): Primary | ICD-10-CM

## 2017-10-31 PROCEDURE — 99213 OFFICE O/P EST LOW 20 MIN: CPT | Performed by: NURSE PRACTITIONER

## 2017-10-31 PROCEDURE — 80176 ASSAY OF LIDOCAINE: CPT | Performed by: NURSE PRACTITIONER

## 2017-10-31 PROCEDURE — 51700 IRRIGATION OF BLADDER: CPT | Performed by: NURSE PRACTITIONER

## 2017-10-31 NOTE — PROGRESS NOTES
Subjective   Maribell Whitley is a 46 y.o. female.     History of Present Illness    The following portions of the patient's history were reviewed and updated as appropriate: allergies, current medications, past family history, past medical history, past social history, past surgical history and problem list.    Review of Systems   Constitutional: Negative for activity change, appetite change, chills, diaphoresis, fatigue, fever and unexpected weight change.   HENT: Negative for congestion, ear discharge, ear pain, facial swelling, hearing loss, mouth sores, nosebleeds, postnasal drip, rhinorrhea, sinus pressure, sneezing, sore throat, tinnitus, trouble swallowing and voice change.    Eyes: Negative for photophobia, pain, discharge, redness, itching and visual disturbance.   Respiratory: Negative for apnea, cough, choking, chest tightness and shortness of breath.    Cardiovascular: Negative for chest pain, palpitations and leg swelling.   Gastrointestinal: Negative for abdominal distention, abdominal pain, anal bleeding, blood in stool, constipation, diarrhea, nausea, rectal pain and vomiting.   Endocrine: Negative for cold intolerance and heat intolerance.   Genitourinary: Negative for decreased urine volume, difficulty urinating, dyspareunia, flank pain, frequency, genital sores, hematuria, menstrual problem, pelvic pain, urgency, vaginal bleeding, vaginal discharge and vaginal pain.   Musculoskeletal: Negative for arthralgias, back pain, joint swelling and myalgias.   Skin: Negative for color change and rash.   Allergic/Immunologic: Negative for environmental allergies.   Neurological: Negative for dizziness, syncope, weakness, numbness and headaches.   Hematological: Negative for adenopathy.   Psychiatric/Behavioral: Negative for agitation, confusion and sleep disturbance. The patient is not nervous/anxious.        Objective   Physical Exam   Constitutional: She is oriented to person, place, and time. She appears  "well-developed and well-nourished.   Cardiovascular: Normal rate and regular rhythm.    Pulmonary/Chest: Effort normal and breath sounds normal.   Neurological: She is alert and oriented to person, place, and time.   Psychiatric: She has a normal mood and affect. Her behavior is normal.   Nursing note and vitals reviewed.      Assessment/Plan   Maribell was seen today for intersticial cystitis.    Diagnoses and all orders for this visit:    IC (interstitial cystitis)  Comments:  Patient reports she is doing \"great\".  Denies urinary frequency or urgency at this point.  Patient has 2 more scheduled IC treatments.  Discussed after-care and prn IC treatments.  RTO in 3 days.                  "

## 2017-10-31 NOTE — PROGRESS NOTES
Procedure   Procedures    Pt's Urethra is cleansed with Betadine, 14 Russian Catheter is inserted without difficulty. Bladder is emptied of urine.  IC cocktail made up of 1% Xylocaine, Sodium Bicarb, normal saline and Elmiron powder is inserted into the Catheter and Pt is encouraged to hold as long as possible.

## 2017-11-03 ENCOUNTER — OFFICE VISIT (OUTPATIENT)
Dept: OBSTETRICS AND GYNECOLOGY | Facility: CLINIC | Age: 47
End: 2017-11-03

## 2017-11-03 VITALS
BODY MASS INDEX: 19.88 KG/M2 | DIASTOLIC BLOOD PRESSURE: 80 MMHG | SYSTOLIC BLOOD PRESSURE: 104 MMHG | WEIGHT: 108 LBS | HEIGHT: 62 IN

## 2017-11-03 DIAGNOSIS — N30.10 IC (INTERSTITIAL CYSTITIS): Primary | ICD-10-CM

## 2017-11-03 PROCEDURE — 99213 OFFICE O/P EST LOW 20 MIN: CPT | Performed by: NURSE PRACTITIONER

## 2017-11-03 NOTE — PROGRESS NOTES
Subjective   Maribell Whitley is a 46 y.o. female.     History of Present Illness    The following portions of the patient's history were reviewed and updated as appropriate: allergies, current medications, past family history, past medical history, past social history, past surgical history and problem list.    Review of Systems   Constitutional: Negative for activity change, appetite change, chills, diaphoresis, fatigue, fever and unexpected weight change.   HENT: Negative for congestion, ear discharge, ear pain, facial swelling, hearing loss, mouth sores, nosebleeds, postnasal drip, rhinorrhea, sinus pressure, sneezing, sore throat, tinnitus, trouble swallowing and voice change.    Eyes: Negative for photophobia, pain, discharge, redness, itching and visual disturbance.   Respiratory: Negative for apnea, cough, choking, chest tightness and shortness of breath.    Cardiovascular: Negative for chest pain, palpitations and leg swelling.   Gastrointestinal: Negative for abdominal distention, abdominal pain, anal bleeding, blood in stool, constipation, diarrhea, nausea, rectal pain and vomiting.   Endocrine: Negative for cold intolerance and heat intolerance.   Genitourinary: Negative for decreased urine volume, difficulty urinating, dyspareunia, flank pain, frequency, genital sores, hematuria, menstrual problem, pelvic pain, urgency, vaginal bleeding, vaginal discharge and vaginal pain.   Musculoskeletal: Negative for arthralgias, back pain, joint swelling and myalgias.   Skin: Negative for color change and rash.   Allergic/Immunologic: Negative for environmental allergies.   Neurological: Negative for dizziness, syncope, weakness, numbness and headaches.   Hematological: Negative for adenopathy.   Psychiatric/Behavioral: Negative for agitation, confusion and sleep disturbance. The patient is not nervous/anxious.        Objective   Physical Exam   Constitutional: She is oriented to person, place, and time. She appears  well-developed and well-nourished.   Cardiovascular: Normal rate and regular rhythm.    Pulmonary/Chest: Effort normal and breath sounds normal.   Neurological: She is alert and oriented to person, place, and time.   Psychiatric: She has a normal mood and affect. Her behavior is normal.   Nursing note and vitals reviewed.      Assessment/Plan   Maribell was seen today for intersticial cystitis.    Diagnoses and all orders for this visit:    IC (interstitial cystitis)

## 2017-11-03 NOTE — PROGRESS NOTES
Pt's Urethra is cleansed with Betadine, 14 Beninese Catheter is inserted without difficulty. Bladder is emptied of urine.  IC cocktail made up of 1% Xylocaine, Sodium Bicarb, normal saline and Elmiron powder is inserted into the Catheter and Pt is encouraged to hold as long as possible.

## 2017-11-07 ENCOUNTER — OFFICE VISIT (OUTPATIENT)
Dept: OBSTETRICS AND GYNECOLOGY | Facility: CLINIC | Age: 47
End: 2017-11-07

## 2017-11-07 VITALS
BODY MASS INDEX: 19.88 KG/M2 | SYSTOLIC BLOOD PRESSURE: 118 MMHG | WEIGHT: 108 LBS | DIASTOLIC BLOOD PRESSURE: 70 MMHG | HEIGHT: 62 IN

## 2017-11-07 DIAGNOSIS — N30.10 IC (INTERSTITIAL CYSTITIS): Primary | ICD-10-CM

## 2017-11-07 PROCEDURE — 51700 IRRIGATION OF BLADDER: CPT | Performed by: NURSE PRACTITIONER

## 2017-11-07 PROCEDURE — 80176 ASSAY OF LIDOCAINE: CPT | Performed by: NURSE PRACTITIONER

## 2017-11-07 NOTE — PROGRESS NOTES
Subjective   Maribell Whitley is a 46 y.o. female.     History of Present Illness    The following portions of the patient's history were reviewed and updated as appropriate: allergies, current medications, past family history, past medical history, past social history, past surgical history and problem list.    Review of Systems   Constitutional: Negative for activity change, appetite change, chills, diaphoresis, fatigue, fever and unexpected weight change.   HENT: Negative for congestion, ear discharge, ear pain, facial swelling, hearing loss, mouth sores, nosebleeds, postnasal drip, rhinorrhea, sinus pressure, sneezing, sore throat, tinnitus, trouble swallowing and voice change.    Eyes: Negative for photophobia, pain, discharge, redness, itching and visual disturbance.   Respiratory: Negative for apnea, cough, choking, chest tightness and shortness of breath.    Cardiovascular: Negative for chest pain, palpitations and leg swelling.   Gastrointestinal: Negative for abdominal distention, abdominal pain, anal bleeding, blood in stool, constipation, diarrhea, nausea, rectal pain and vomiting.   Endocrine: Negative for cold intolerance and heat intolerance.   Genitourinary: Negative for decreased urine volume, difficulty urinating, dyspareunia, flank pain, frequency, genital sores, hematuria, menstrual problem, pelvic pain, urgency, vaginal bleeding, vaginal discharge and vaginal pain.   Musculoskeletal: Negative for arthralgias, back pain, joint swelling and myalgias.   Skin: Negative for color change and rash.   Allergic/Immunologic: Negative for environmental allergies.   Neurological: Negative for dizziness, syncope, weakness, numbness and headaches.   Hematological: Negative for adenopathy.   Psychiatric/Behavioral: Negative for agitation, confusion and sleep disturbance. The patient is not nervous/anxious.        Objective   Physical Exam   Constitutional: She is oriented to person, place, and time. She appears  well-developed and well-nourished.   Cardiovascular: Normal rate and regular rhythm.    Pulmonary/Chest: Effort normal and breath sounds normal.   Genitourinary: Vagina normal.   Neurological: She is alert and oriented to person, place, and time.   Psychiatric: She has a normal mood and affect. Her behavior is normal.   Nursing note and vitals reviewed.      Assessment/Plan   There are no diagnoses linked to this encounter.

## 2017-11-07 NOTE — PROGRESS NOTES
Pt's Urethra is cleansed with Betadine, 14 Venezuelan Catheter is inserted without difficulty. Bladder is emptied of urine.  IC cocktail made up of 1% Xylocaine, Sodium Bicarb, normal saline and Elmiron powder is inserted into the Catheter and Pt is encouraged to hold as long as possible.

## 2018-01-18 DIAGNOSIS — N30.10 IC (INTERSTITIAL CYSTITIS): ICD-10-CM

## 2018-03-23 DIAGNOSIS — F43.21 GRIEF REACTION: Primary | ICD-10-CM

## 2018-03-23 RX ORDER — BUSPIRONE HYDROCHLORIDE 5 MG/1
5 TABLET ORAL 2 TIMES DAILY
Qty: 30 TABLET | Refills: 3 | Status: SHIPPED | OUTPATIENT
Start: 2018-03-23 | End: 2018-03-28 | Stop reason: SDUPTHER

## 2018-03-28 DIAGNOSIS — F43.21 GRIEF REACTION: ICD-10-CM

## 2018-03-28 DIAGNOSIS — G47.419 NARCOLEPSY WITHOUT CATAPLEXY: Primary | ICD-10-CM

## 2018-03-28 RX ORDER — BUSPIRONE HYDROCHLORIDE 10 MG/1
10 TABLET ORAL 2 TIMES DAILY
Qty: 60 TABLET | Refills: 3 | Status: SHIPPED | OUTPATIENT
Start: 2018-03-28 | End: 2018-03-28 | Stop reason: SDUPTHER

## 2018-03-28 RX ORDER — BUSPIRONE HYDROCHLORIDE 10 MG/1
10 TABLET ORAL 2 TIMES DAILY
Qty: 60 TABLET | Refills: 3 | Status: SHIPPED | OUTPATIENT
Start: 2018-03-28

## 2018-05-01 DIAGNOSIS — N30.10 IC (INTERSTITIAL CYSTITIS): ICD-10-CM

## 2018-06-18 ENCOUNTER — LAB (OUTPATIENT)
Dept: LAB | Facility: HOSPITAL | Age: 48
End: 2018-06-18

## 2018-06-18 DIAGNOSIS — R53.83 MALAISE AND FATIGUE: Primary | ICD-10-CM

## 2018-06-18 DIAGNOSIS — R53.81 MALAISE AND FATIGUE: Primary | ICD-10-CM

## 2018-06-18 DIAGNOSIS — R53.83 MALAISE AND FATIGUE: ICD-10-CM

## 2018-06-18 DIAGNOSIS — R53.81 MALAISE AND FATIGUE: ICD-10-CM

## 2018-06-18 DIAGNOSIS — D64.9 ANEMIA, UNSPECIFIED TYPE: Primary | ICD-10-CM

## 2018-06-18 LAB
25(OH)D3 SERPL-MCNC: 46.7 NG/ML (ref 30–100)
ALBUMIN SERPL-MCNC: 4.4 G/DL (ref 3.5–5)
ALBUMIN/GLOB SERPL: 1.4 G/DL (ref 1.1–2.5)
ALP SERPL-CCNC: 117 U/L (ref 24–120)
ALT SERPL W P-5'-P-CCNC: 27 U/L (ref 0–54)
ANION GAP SERPL CALCULATED.3IONS-SCNC: 14 MMOL/L (ref 4–13)
AST SERPL-CCNC: 26 U/L (ref 7–45)
BASOPHILS # BLD AUTO: 0.05 10*3/MM3 (ref 0–0.2)
BASOPHILS NFR BLD AUTO: 1.4 % (ref 0–2)
BILIRUB SERPL-MCNC: 0.4 MG/DL (ref 0.1–1)
BUN BLD-MCNC: 11 MG/DL (ref 5–21)
BUN/CREAT SERPL: 20.4 (ref 7–25)
CALCIUM SPEC-SCNC: 9.1 MG/DL (ref 8.4–10.4)
CHLORIDE SERPL-SCNC: 101 MMOL/L (ref 98–110)
CO2 SERPL-SCNC: 27 MMOL/L (ref 24–31)
CREAT BLD-MCNC: 0.54 MG/DL (ref 0.5–1.4)
DEPRECATED RDW RBC AUTO: 42.8 FL (ref 40–54)
EOSINOPHIL # BLD AUTO: 0.03 10*3/MM3 (ref 0–0.7)
EOSINOPHIL NFR BLD AUTO: 0.8 % (ref 0–4)
ERYTHROCYTE [DISTWIDTH] IN BLOOD BY AUTOMATED COUNT: 14.9 % (ref 12–15)
FERRITIN SERPL-MCNC: 5.59 NG/ML (ref 6.24–137)
GFR SERPL CREATININE-BSD FRML MDRD: 122 ML/MIN/1.73
GLOBULIN UR ELPH-MCNC: 3.2 GM/DL
GLUCOSE BLD-MCNC: 80 MG/DL (ref 70–100)
HCT VFR BLD AUTO: 37 % (ref 37–47)
HGB BLD-MCNC: 11.3 G/DL (ref 12–16)
IMM GRANULOCYTES # BLD: 0.02 10*3/MM3 (ref 0–0.03)
IMM GRANULOCYTES NFR BLD: 0.6 % (ref 0–5)
IRON 24H UR-MRATE: 22 MCG/DL (ref 42–180)
IRON SATN MFR SERPL: 4 % (ref 20–45)
LYMPHOCYTES # BLD AUTO: 1.26 10*3/MM3 (ref 0.72–4.86)
LYMPHOCYTES NFR BLD AUTO: 35.7 % (ref 15–45)
MCH RBC QN AUTO: 24.2 PG (ref 28–32)
MCHC RBC AUTO-ENTMCNC: 30.5 G/DL (ref 33–36)
MCV RBC AUTO: 79.2 FL (ref 82–98)
MONOCYTES # BLD AUTO: 0.31 10*3/MM3 (ref 0.19–1.3)
MONOCYTES NFR BLD AUTO: 8.8 % (ref 4–12)
NEUTROPHILS # BLD AUTO: 1.86 10*3/MM3 (ref 1.87–8.4)
NEUTROPHILS NFR BLD AUTO: 52.7 % (ref 39–78)
NRBC BLD MANUAL-RTO: 0 /100 WBC (ref 0–0)
PLATELET # BLD AUTO: 332 10*3/MM3 (ref 130–400)
PMV BLD AUTO: 10.9 FL (ref 6–12)
POTASSIUM BLD-SCNC: 3.8 MMOL/L (ref 3.5–5.3)
PROT SERPL-MCNC: 7.6 G/DL (ref 6.3–8.7)
RBC # BLD AUTO: 4.67 10*6/MM3 (ref 4.2–5.4)
SODIUM BLD-SCNC: 142 MMOL/L (ref 135–145)
T3FREE SERPL-MCNC: 3.75 PG/ML (ref 2.77–5.27)
T4 FREE SERPL-MCNC: 1.12 NG/DL (ref 0.78–2.19)
TIBC SERPL-MCNC: 489 MCG/DL (ref 225–420)
TSH SERPL DL<=0.05 MIU/L-ACNC: 1.18 MIU/ML (ref 0.47–4.68)
VIT B12 BLD-MCNC: 515 PG/ML (ref 239–931)
WBC NRBC COR # BLD: 3.53 10*3/MM3 (ref 4.8–10.8)

## 2018-06-18 PROCEDURE — 83540 ASSAY OF IRON: CPT | Performed by: CLINICAL NURSE SPECIALIST

## 2018-06-18 PROCEDURE — 36415 COLL VENOUS BLD VENIPUNCTURE: CPT

## 2018-06-18 PROCEDURE — 82607 VITAMIN B-12: CPT | Performed by: CLINICAL NURSE SPECIALIST

## 2018-06-18 PROCEDURE — 80053 COMPREHEN METABOLIC PANEL: CPT | Performed by: CLINICAL NURSE SPECIALIST

## 2018-06-18 PROCEDURE — 82728 ASSAY OF FERRITIN: CPT | Performed by: CLINICAL NURSE SPECIALIST

## 2018-06-18 PROCEDURE — 84439 ASSAY OF FREE THYROXINE: CPT | Performed by: CLINICAL NURSE SPECIALIST

## 2018-06-18 PROCEDURE — 85025 COMPLETE CBC W/AUTO DIFF WBC: CPT | Performed by: CLINICAL NURSE SPECIALIST

## 2018-06-18 PROCEDURE — 83550 IRON BINDING TEST: CPT | Performed by: CLINICAL NURSE SPECIALIST

## 2018-06-18 PROCEDURE — 84481 FREE ASSAY (FT-3): CPT | Performed by: CLINICAL NURSE SPECIALIST

## 2018-06-18 PROCEDURE — 82306 VITAMIN D 25 HYDROXY: CPT | Performed by: CLINICAL NURSE SPECIALIST

## 2018-06-18 PROCEDURE — 84443 ASSAY THYROID STIM HORMONE: CPT | Performed by: CLINICAL NURSE SPECIALIST

## 2018-07-06 ENCOUNTER — TRANSCRIBE ORDERS (OUTPATIENT)
Dept: ADMINISTRATIVE | Facility: HOSPITAL | Age: 48
End: 2018-07-06

## 2018-07-06 DIAGNOSIS — Z12.31 ENCOUNTER FOR SCREENING MAMMOGRAM FOR MALIGNANT NEOPLASM OF BREAST: Primary | ICD-10-CM

## 2018-07-16 ENCOUNTER — HOSPITAL ENCOUNTER (OUTPATIENT)
Dept: MAMMOGRAPHY | Facility: HOSPITAL | Age: 48
Discharge: HOME | End: 2018-07-16
Attending: INTERNAL MEDICINE | Admitting: INTERNAL MEDICINE

## 2018-07-16 DIAGNOSIS — Z12.31 ENCOUNTER FOR SCREENING MAMMOGRAM FOR MALIGNANT NEOPLASM OF BREAST: ICD-10-CM

## 2018-07-16 PROCEDURE — 77067 SCR MAMMO BI INCL CAD: CPT

## 2018-07-16 PROCEDURE — 77063 BREAST TOMOSYNTHESIS BI: CPT

## 2018-07-18 ENCOUNTER — TRANSCRIBE ORDERS (OUTPATIENT)
Dept: ADMINISTRATIVE | Facility: HOSPITAL | Age: 48
End: 2018-07-18

## 2018-07-18 DIAGNOSIS — R92.8 ABNORMAL MAMMOGRAPHY: Primary | ICD-10-CM

## 2018-07-19 ENCOUNTER — HOSPITAL ENCOUNTER (OUTPATIENT)
Dept: ULTRASOUND IMAGING | Facility: HOSPITAL | Age: 48
Discharge: HOME | End: 2018-07-19
Attending: INTERNAL MEDICINE | Admitting: INTERNAL MEDICINE

## 2018-07-19 DIAGNOSIS — R92.8 ABNORMAL MAMMOGRAPHY: ICD-10-CM

## 2018-07-19 PROCEDURE — 76641 ULTRASOUND BREAST COMPLETE: CPT

## 2018-08-24 ENCOUNTER — TRANSCRIBE ORDERS (OUTPATIENT)
Dept: ADMINISTRATIVE | Facility: HOSPITAL | Age: 48
End: 2018-08-24

## 2018-08-24 ENCOUNTER — APPOINTMENT (OUTPATIENT)
Dept: LAB | Facility: HOSPITAL | Age: 48
End: 2018-08-24
Attending: INTERNAL MEDICINE

## 2018-08-24 DIAGNOSIS — Z00.00 ENCOUNTER FOR GENERAL ADULT MEDICAL EXAMINATION WITHOUT ABNORMAL FINDINGS: Primary | ICD-10-CM

## 2018-08-24 LAB
ALBUMIN SERPL-MCNC: 5.2 G/DL (ref 3.5–5)
ALBUMIN/GLOB SERPL: 1.4 G/DL (ref 1.1–2.5)
ALP SERPL-CCNC: 109 U/L (ref 24–120)
ALT SERPL W P-5'-P-CCNC: 28 U/L (ref 0–54)
ANION GAP SERPL CALCULATED.3IONS-SCNC: 12 MMOL/L (ref 4–13)
ARTICHOKE IGE QN: 74 MG/DL (ref 0–99)
AST SERPL-CCNC: 28 U/L (ref 7–45)
BASOPHILS # BLD AUTO: 0.04 10*3/MM3 (ref 0–0.2)
BASOPHILS NFR BLD AUTO: 1.1 % (ref 0–2)
BILIRUB SERPL-MCNC: 0.6 MG/DL (ref 0.1–1)
BILIRUB UR QL STRIP: NEGATIVE
BUN BLD-MCNC: 13 MG/DL (ref 5–21)
BUN/CREAT SERPL: 19.7 (ref 7–25)
CALCIUM SPEC-SCNC: 10 MG/DL (ref 8.4–10.4)
CHLORIDE SERPL-SCNC: 102 MMOL/L (ref 98–110)
CHOLEST SERPL-MCNC: 203 MG/DL (ref 130–200)
CLARITY UR: CLEAR
CO2 SERPL-SCNC: 30 MMOL/L (ref 24–31)
COLOR UR: YELLOW
CREAT BLD-MCNC: 0.66 MG/DL (ref 0.5–1.4)
DEPRECATED RDW RBC AUTO: 54.6 FL (ref 40–54)
EOSINOPHIL # BLD AUTO: 0.03 10*3/MM3 (ref 0–0.7)
EOSINOPHIL NFR BLD AUTO: 0.8 % (ref 0–4)
ERYTHROCYTE [DISTWIDTH] IN BLOOD BY AUTOMATED COUNT: 17.3 % (ref 12–15)
GFR SERPL CREATININE-BSD FRML MDRD: 97 ML/MIN/1.73
GLOBULIN UR ELPH-MCNC: 3.8 GM/DL
GLUCOSE BLD-MCNC: 83 MG/DL (ref 70–100)
GLUCOSE UR STRIP-MCNC: NEGATIVE MG/DL
HCT VFR BLD AUTO: 46.9 % (ref 37–47)
HDLC SERPL-MCNC: 108 MG/DL
HGB BLD-MCNC: 15.1 G/DL (ref 12–16)
HGB UR QL STRIP.AUTO: NEGATIVE
IMM GRANULOCYTES # BLD: 0.01 10*3/MM3 (ref 0–0.03)
IMM GRANULOCYTES NFR BLD: 0.3 % (ref 0–5)
KETONES UR QL STRIP: NEGATIVE
LDLC/HDLC SERPL: 0.81 {RATIO}
LEUKOCYTE ESTERASE UR QL STRIP.AUTO: NEGATIVE
LYMPHOCYTES # BLD AUTO: 1.25 10*3/MM3 (ref 0.72–4.86)
LYMPHOCYTES NFR BLD AUTO: 33.8 % (ref 15–45)
MCH RBC QN AUTO: 27.9 PG (ref 28–32)
MCHC RBC AUTO-ENTMCNC: 32.2 G/DL (ref 33–36)
MCV RBC AUTO: 86.7 FL (ref 82–98)
MONOCYTES # BLD AUTO: 0.23 10*3/MM3 (ref 0.19–1.3)
MONOCYTES NFR BLD AUTO: 6.2 % (ref 4–12)
NEUTROPHILS # BLD AUTO: 2.14 10*3/MM3 (ref 1.87–8.4)
NEUTROPHILS NFR BLD AUTO: 57.8 % (ref 39–78)
NITRITE UR QL STRIP: NEGATIVE
NRBC BLD MANUAL-RTO: 0 /100 WBC (ref 0–0)
PH UR STRIP.AUTO: 8 [PH] (ref 5–8)
PLATELET # BLD AUTO: 261 10*3/MM3 (ref 130–400)
PMV BLD AUTO: 11 FL (ref 6–12)
POTASSIUM BLD-SCNC: 4.1 MMOL/L (ref 3.5–5.3)
PROT SERPL-MCNC: 9 G/DL (ref 6.3–8.7)
PROT UR QL STRIP: NEGATIVE
RBC # BLD AUTO: 5.41 10*6/MM3 (ref 4.2–5.4)
SODIUM BLD-SCNC: 144 MMOL/L (ref 135–145)
SP GR UR STRIP: 1.02 (ref 1–1.03)
T4 FREE SERPL-MCNC: 1.05 NG/DL (ref 0.78–2.19)
TRIGL SERPL-MCNC: 40 MG/DL (ref 0–149)
TSH SERPL DL<=0.05 MIU/L-ACNC: 1.74 MIU/ML (ref 0.47–4.68)
UROBILINOGEN UR QL STRIP: NORMAL
WBC NRBC COR # BLD: 3.7 10*3/MM3 (ref 4.8–10.8)

## 2018-08-24 PROCEDURE — 85025 COMPLETE CBC W/AUTO DIFF WBC: CPT | Performed by: INTERNAL MEDICINE

## 2018-08-24 PROCEDURE — 36415 COLL VENOUS BLD VENIPUNCTURE: CPT

## 2018-08-24 PROCEDURE — 84443 ASSAY THYROID STIM HORMONE: CPT | Performed by: INTERNAL MEDICINE

## 2018-08-24 PROCEDURE — 81003 URINALYSIS AUTO W/O SCOPE: CPT | Performed by: INTERNAL MEDICINE

## 2018-08-24 PROCEDURE — 84439 ASSAY OF FREE THYROXINE: CPT | Performed by: INTERNAL MEDICINE

## 2018-08-24 PROCEDURE — 80061 LIPID PANEL: CPT | Performed by: INTERNAL MEDICINE

## 2018-08-24 PROCEDURE — 80053 COMPREHEN METABOLIC PANEL: CPT | Performed by: INTERNAL MEDICINE

## 2019-01-25 RX ORDER — RIZATRIPTAN BENZOATE 10 MG/1
10 TABLET ORAL ONCE AS NEEDED
Qty: 9 TABLET | Refills: 2 | Status: SHIPPED | OUTPATIENT
Start: 2019-01-25